# Patient Record
Sex: MALE | Race: WHITE | Employment: FULL TIME | URBAN - METROPOLITAN AREA
[De-identification: names, ages, dates, MRNs, and addresses within clinical notes are randomized per-mention and may not be internally consistent; named-entity substitution may affect disease eponyms.]

---

## 2017-01-05 ENCOUNTER — OFFICE VISIT (OUTPATIENT)
Dept: ORTHOPEDIC SURGERY | Age: 47
End: 2017-01-05

## 2017-01-05 VITALS
BODY MASS INDEX: 31.71 KG/M2 | HEIGHT: 69 IN | HEART RATE: 92 BPM | DIASTOLIC BLOOD PRESSURE: 86 MMHG | WEIGHT: 214.07 LBS | SYSTOLIC BLOOD PRESSURE: 133 MMHG

## 2017-01-05 DIAGNOSIS — M51.36 DDD (DEGENERATIVE DISC DISEASE), LUMBAR: Primary | ICD-10-CM

## 2017-01-05 PROCEDURE — 99243 OFF/OP CNSLTJ NEW/EST LOW 30: CPT | Performed by: PHYSICIAN ASSISTANT

## 2017-01-27 RX ORDER — CLONAZEPAM 0.5 MG/1
TABLET ORAL
Qty: 180 TABLET | Refills: 0 | Status: SHIPPED | OUTPATIENT
Start: 2017-01-27 | End: 2017-04-26 | Stop reason: SDUPTHER

## 2017-01-27 RX ORDER — PAROXETINE 10 MG/1
TABLET, FILM COATED ORAL
Qty: 30 TABLET | Refills: 0 | Status: SHIPPED | OUTPATIENT
Start: 2017-01-27 | End: 2017-02-10

## 2017-02-10 RX ORDER — VENLAFAXINE 37.5 MG/1
TABLET ORAL
Qty: 60 TABLET | Refills: 0 | Status: SHIPPED | OUTPATIENT
Start: 2017-02-10 | End: 2017-02-13 | Stop reason: SDUPTHER

## 2017-02-13 RX ORDER — VENLAFAXINE 75 MG/1
TABLET ORAL
Qty: 30 TABLET | Refills: 1 | Status: SHIPPED | OUTPATIENT
Start: 2017-02-13 | End: 2017-04-26

## 2017-02-27 RX ORDER — PAROXETINE 10 MG/1
TABLET, FILM COATED ORAL
Qty: 30 TABLET | Refills: 0 | Status: SHIPPED | OUTPATIENT
Start: 2017-02-27 | End: 2017-03-27 | Stop reason: SDUPTHER

## 2017-03-27 RX ORDER — PAROXETINE 10 MG/1
TABLET, FILM COATED ORAL
Qty: 30 TABLET | Refills: 0 | Status: SHIPPED | OUTPATIENT
Start: 2017-03-27 | End: 2017-04-05

## 2017-04-05 ENCOUNTER — OFFICE VISIT (OUTPATIENT)
Dept: FAMILY MEDICINE CLINIC | Age: 47
End: 2017-04-05

## 2017-04-05 VITALS
BODY MASS INDEX: 30.21 KG/M2 | SYSTOLIC BLOOD PRESSURE: 138 MMHG | OXYGEN SATURATION: 98 % | DIASTOLIC BLOOD PRESSURE: 80 MMHG | HEART RATE: 74 BPM | WEIGHT: 204 LBS | HEIGHT: 69 IN

## 2017-04-05 DIAGNOSIS — F41.0 PANIC ATTACKS: ICD-10-CM

## 2017-04-05 DIAGNOSIS — F41.9 ANXIETY: Primary | ICD-10-CM

## 2017-04-05 DIAGNOSIS — M51.36 DEGENERATIVE DISC DISEASE, LUMBAR: ICD-10-CM

## 2017-04-05 DIAGNOSIS — M54.40 CHRONIC LOW BACK PAIN WITH SCIATICA, SCIATICA LATERALITY UNSPECIFIED, UNSPECIFIED BACK PAIN LATERALITY: ICD-10-CM

## 2017-04-05 DIAGNOSIS — G89.29 CHRONIC LOW BACK PAIN WITH SCIATICA, SCIATICA LATERALITY UNSPECIFIED, UNSPECIFIED BACK PAIN LATERALITY: ICD-10-CM

## 2017-04-05 PROCEDURE — 99213 OFFICE O/P EST LOW 20 MIN: CPT | Performed by: FAMILY MEDICINE

## 2017-04-05 RX ORDER — PAROXETINE HYDROCHLORIDE 20 MG/1
20 TABLET, FILM COATED ORAL DAILY
Qty: 30 TABLET | Refills: 3 | Status: SHIPPED | OUTPATIENT
Start: 2017-04-05 | End: 2017-04-26 | Stop reason: SDUPTHER

## 2017-04-05 ASSESSMENT — ENCOUNTER SYMPTOMS
RESPIRATORY NEGATIVE: 1
GASTROINTESTINAL NEGATIVE: 1

## 2017-04-10 LAB
6-ACETYLMORPHINE: NOT DETECTED
7-AMINOCLONAZEPAM: NOT DETECTED
ALPHA-OH-ALPRAZOLAM: NOT DETECTED
ALPRAZOLAM: NOT DETECTED
AMPHETAMINE: NOT DETECTED
BARBITURATES: NOT DETECTED
BENZOYLECGONINE: NOT DETECTED
BUPRENORPHINE: NOT DETECTED
CARISOPRODOL: NOT DETECTED
CLONAZEPAM: NOT DETECTED
CODEINE: NOT DETECTED
CREATININE URINE: 176.4 MG/DL (ref 20–400)
DIAZEPAM: NOT DETECTED
DRUGS EXPECTED: NORMAL
EER PAIN MGT DRUG PANEL, HIGH RES/EMIT U: NORMAL
ETHYL GLUCURONIDE: NOT DETECTED
FENTANYL: NOT DETECTED
HYDROCODONE: NOT DETECTED
HYDROMORPHONE: NOT DETECTED
LORAZEPAM: NOT DETECTED
MARIJUANA METABOLITE: NOT DETECTED
MDA: NOT DETECTED
MDEA: NOT DETECTED
MDMA URINE: NOT DETECTED
MEPERIDINE: NOT DETECTED
METHADONE: NOT DETECTED
METHAMPHETAMINE: NOT DETECTED
METHYLPHENIDATE: NOT DETECTED
MIDAZOLAM: NOT DETECTED
MORPHINE: NOT DETECTED
NORBUPRENORPHINE, FREE: NOT DETECTED
NORDIAZEPAM: NOT DETECTED
NORFENTANYL: NOT DETECTED
NORHYDROCODONE, URINE: NOT DETECTED
NOROXYCODONE: NOT DETECTED
NOROXYMORPHONE, URINE: NOT DETECTED
OXAZEPAM: NOT DETECTED
OXYCODONE: NOT DETECTED
OXYMORPHONE: NOT DETECTED
PAIN MANAGEMENT DRUG PANEL: NORMAL
PAIN MANAGEMENT DRUG PANEL: NORMAL
PCP: NOT DETECTED
PHENTERMINE: NOT DETECTED
PROPOXYPHENE: NOT DETECTED
TAPENTADOL, URINE: NOT DETECTED
TAPENTADOL-O-SULFATE, URINE: NOT DETECTED
TEMAZEPAM: NOT DETECTED
TRAMADOL: NOT DETECTED
ZOLPIDEM: NOT DETECTED

## 2017-04-26 RX ORDER — CLONAZEPAM 0.5 MG/1
TABLET ORAL
Qty: 180 TABLET | Refills: 0 | Status: SHIPPED | OUTPATIENT
Start: 2017-04-26 | End: 2017-07-25 | Stop reason: SDUPTHER

## 2017-04-26 RX ORDER — PAROXETINE HYDROCHLORIDE 20 MG/1
20 TABLET, FILM COATED ORAL DAILY
Qty: 90 TABLET | Refills: 0 | Status: SHIPPED | OUTPATIENT
Start: 2017-04-26 | End: 2017-05-05

## 2017-07-13 ENCOUNTER — OFFICE VISIT (OUTPATIENT)
Dept: FAMILY MEDICINE CLINIC | Age: 47
End: 2017-07-13

## 2017-07-13 VITALS
DIASTOLIC BLOOD PRESSURE: 80 MMHG | BODY MASS INDEX: 31.1 KG/M2 | OXYGEN SATURATION: 99 % | WEIGHT: 210 LBS | SYSTOLIC BLOOD PRESSURE: 124 MMHG | HEART RATE: 80 BPM | HEIGHT: 69 IN

## 2017-07-13 DIAGNOSIS — F41.0 PANIC ATTACKS: ICD-10-CM

## 2017-07-13 DIAGNOSIS — F41.9 ANXIETY: Primary | ICD-10-CM

## 2017-07-13 PROCEDURE — 99213 OFFICE O/P EST LOW 20 MIN: CPT | Performed by: FAMILY MEDICINE

## 2017-07-13 ASSESSMENT — ENCOUNTER SYMPTOMS: RESPIRATORY NEGATIVE: 1

## 2017-07-27 RX ORDER — CLONAZEPAM 0.5 MG/1
TABLET ORAL
Qty: 180 TABLET | Refills: 0 | Status: SHIPPED | OUTPATIENT
Start: 2017-07-27 | End: 2017-11-07 | Stop reason: SDUPTHER

## 2017-08-02 RX ORDER — CLONAZEPAM 0.5 MG/1
TABLET ORAL
Qty: 180 TABLET | Refills: 0 | OUTPATIENT
Start: 2017-08-02

## 2017-08-30 RX ORDER — PAROXETINE HYDROCHLORIDE 20 MG/1
20 TABLET, FILM COATED ORAL DAILY
Qty: 90 TABLET | Refills: 0 | Status: SHIPPED | OUTPATIENT
Start: 2017-08-30 | End: 2018-01-02 | Stop reason: SDUPTHER

## 2017-09-11 RX ORDER — PAROXETINE HYDROCHLORIDE 20 MG/1
20 TABLET, FILM COATED ORAL DAILY
Qty: 90 TABLET | Refills: 0 | OUTPATIENT
Start: 2017-09-11

## 2017-10-13 ENCOUNTER — OFFICE VISIT (OUTPATIENT)
Dept: FAMILY MEDICINE CLINIC | Age: 47
End: 2017-10-13

## 2017-10-13 VITALS
HEART RATE: 71 BPM | BODY MASS INDEX: 29.98 KG/M2 | DIASTOLIC BLOOD PRESSURE: 80 MMHG | SYSTOLIC BLOOD PRESSURE: 120 MMHG | WEIGHT: 203 LBS | OXYGEN SATURATION: 96 %

## 2017-10-13 DIAGNOSIS — F41.9 ANXIETY: Primary | ICD-10-CM

## 2017-10-13 DIAGNOSIS — F41.0 PANIC ATTACKS: ICD-10-CM

## 2017-10-13 PROCEDURE — 99213 OFFICE O/P EST LOW 20 MIN: CPT | Performed by: FAMILY MEDICINE

## 2017-10-13 ASSESSMENT — ENCOUNTER SYMPTOMS
GASTROINTESTINAL NEGATIVE: 1
RESPIRATORY NEGATIVE: 1

## 2017-10-13 NOTE — PATIENT INSTRUCTIONS
Corey Knight was seen today for anxiety.     Diagnoses and all orders for this visit:    Anxiety  Meds as needed  Panic attacks  As above

## 2017-10-19 ENCOUNTER — OFFICE VISIT (OUTPATIENT)
Dept: URGENT CARE | Age: 47
End: 2017-10-19

## 2017-10-19 VITALS
DIASTOLIC BLOOD PRESSURE: 86 MMHG | HEART RATE: 66 BPM | BODY MASS INDEX: 30.07 KG/M2 | SYSTOLIC BLOOD PRESSURE: 114 MMHG | HEIGHT: 69 IN | TEMPERATURE: 98.6 F | WEIGHT: 203 LBS | RESPIRATION RATE: 12 BRPM

## 2017-10-19 DIAGNOSIS — S46.912A ELBOW STRAIN, LEFT, INITIAL ENCOUNTER: Primary | ICD-10-CM

## 2017-10-19 PROCEDURE — 99203 OFFICE O/P NEW LOW 30 MIN: CPT | Performed by: EMERGENCY MEDICINE

## 2017-10-19 RX ORDER — NAPROXEN 500 MG/1
500 TABLET ORAL 2 TIMES DAILY PRN
Qty: 30 TABLET | Refills: 0 | Status: SHIPPED | OUTPATIENT
Start: 2017-10-19 | End: 2020-01-22

## 2017-10-19 NOTE — PROGRESS NOTES
Subjective:      Patient ID: Roberto Juarez is a 51 y.o. male. Left elbow pain after left forearm was \"jerked\" while lifting a log. Arm Injury   This is a new problem. Episode onset: 4 days ago. The problem occurs constantly. The problem has been gradually improving. Pertinent negatives include no numbness or weakness. Associated symptoms comments: None. The symptoms are aggravated by bending. He has tried NSAIDs for the symptoms. The treatment provided mild relief. Review of Systems   Musculoskeletal:        Left elbow pain   Neurological: Negative for weakness and numbness. All other systems reviewed and are negative. Objective:   Physical Exam   Constitutional: He is oriented to person, place, and time. He appears well-developed and well-nourished. No distress. Musculoskeletal: Normal range of motion. He exhibits tenderness. He exhibits no edema or deformity. Normal exam of the left upper extremity, no swelling or deformity noted. Pain on palpation over the medial epicondyle. FROM, no neurovascular deficits. Neurological: He is alert and oriented to person, place, and time. He has normal reflexes. No cranial nerve deficit. He exhibits normal muscle tone. Coordination normal.   Skin: Skin is warm and dry. No rash noted. He is not diaphoretic. No erythema. No pallor. Psychiatric: He has a normal mood and affect. His behavior is normal. Judgment and thought content normal.   Nursing note and vitals reviewed. Assessment:      1. Elbow strain, left, initial encounter            Plan:      Prasad Lopez was seen today for elbow injury. Diagnoses and all orders for this visit:    Elbow strain, left, initial encounter  -     XR ELBOW LEFT (2 VIEWS); Future  -     Slings  -     naproxen (NAPROSYN) 500 MG tablet; Take 1 tablet by mouth 2 times daily as needed for Pain      X-rays on my review were negative for acute findings.

## 2017-10-19 NOTE — PATIENT INSTRUCTIONS
Follow-up with your primary care physician in 1 week if not improving. If you do not have a primary care physician, call [phone] for a referral or visit [email or web address]    Patient Education        Elbow Sprain: Care Instructions  Your Care Instructions    An elbow sprain occurs when you overstretch or tear the ligaments around your elbow. Ligaments are the tough tissues that connect one bone to another. A sprain can happen when you fall or when you play sports or do chores around the house. Most sprains will heal with some treatment at home. Follow-up care is a key part of your treatment and safety. Be sure to make and go to all appointments, and call your doctor if you are having problems. It's also a good idea to know your test results and keep a list of the medicines you take. How can you care for yourself at home? · Follow your doctor's directions for wearing a splint, elbow pad, sling, or elastic bandage. Wrapping the elbow may help reduce or prevent swelling. · Rest and protect your elbow. Do not do any activity that hurts your elbow. · Apply ice or a cold pack to your elbow for 10 to 20 minutes at a time to reduce swelling. Try this every 1 to 2 hours for 3 days (when you are awake) or until the swelling goes down. Put a thin cloth between the ice and your skin. · After 2 or 3 days, if your swelling is gone, apply a heating pad on low or a warm cloth to your elbow. This helps keep your arm flexible. Some doctors suggest that you go back and forth between hot and cold. Keep the splint dry. · Prop up your elbow on pillows while you apply ice or anytime you sit or lie down. Try to keep the elbow at or above the level of your heart to help reduce swelling. · Take pain medicines exactly as directed. ¨ If the doctor gave you a prescription medicine for pain, take it as prescribed.   ¨ If you are not taking a prescription pain medicine, ask your doctor if you can take an over-the-counter

## 2017-11-09 RX ORDER — CLONAZEPAM 0.5 MG/1
TABLET ORAL
Qty: 180 TABLET | Refills: 0 | Status: SHIPPED | OUTPATIENT
Start: 2017-11-09 | End: 2018-02-10 | Stop reason: SDUPTHER

## 2018-01-17 ENCOUNTER — OFFICE VISIT (OUTPATIENT)
Dept: FAMILY MEDICINE CLINIC | Age: 48
End: 2018-01-17

## 2018-01-17 VITALS
OXYGEN SATURATION: 98 % | HEIGHT: 69 IN | WEIGHT: 214 LBS | SYSTOLIC BLOOD PRESSURE: 120 MMHG | HEART RATE: 81 BPM | DIASTOLIC BLOOD PRESSURE: 70 MMHG | BODY MASS INDEX: 31.7 KG/M2

## 2018-01-17 DIAGNOSIS — Z23 FLU VACCINE NEED: ICD-10-CM

## 2018-01-17 DIAGNOSIS — F41.9 ANXIETY: Primary | ICD-10-CM

## 2018-01-17 DIAGNOSIS — F41.0 PANIC ATTACKS: ICD-10-CM

## 2018-01-17 PROCEDURE — 90686 IIV4 VACC NO PRSV 0.5 ML IM: CPT | Performed by: FAMILY MEDICINE

## 2018-01-17 PROCEDURE — 99213 OFFICE O/P EST LOW 20 MIN: CPT | Performed by: FAMILY MEDICINE

## 2018-01-17 PROCEDURE — 90471 IMMUNIZATION ADMIN: CPT | Performed by: FAMILY MEDICINE

## 2018-01-17 ASSESSMENT — ENCOUNTER SYMPTOMS
RESPIRATORY NEGATIVE: 1
GASTROINTESTINAL NEGATIVE: 1

## 2018-01-17 NOTE — PROGRESS NOTES
Subjective:      Patient ID: Mick Moreno is a 49 y.o. male. In for check on Anxiety & Panic--doig OK w meds    Prior to Visit Medications :  Medication PARoxetine (PAXIL) 20 MG tablet, Sig TAKE 1 TABLET BY MOUTH ONE TIME A DAY , Taking? Yes, Authorizing Provider David Stevens, DO    Medication clonazePAM (KLONOPIN) 0.5 MG tablet, Sig TAKE 1 TABLET BY MOUTH TWO TIMES A DAY AS NEEDED FOR ANXIETY, Taking? Yes, Authorizing Provider David Stevens, DO    Medication naproxen (NAPROSYN) 500 MG tablet, Sig Take 1 tablet by mouth 2 times daily as needed for Pain, Taking? Yes, Authorizing Provider Pedro Godwin MD    Medication meloxicam (MOBIC) 15 MG tablet, Sig i po qd PRN, Taking? Yes, Authorizing Provider Albert Dasilva PA-C    Medication therapeutic multivitamin-minerals [facility]) tablet, Sig Take 1 tablet by mouth daily. Indications: OTC, Taking? Yes, Authorizing Provider Historical MD Patito      Past Medical History:  No date: Anxiety  No date: Chronic low back pain      Comment: level 5  4/9/2015: Degenerative disc disease, lumbar  No date: Hyperlipidemia  No date: Hypertension  No date: Sleep apnea      Comment: uses c-pap          Review of Systems   Respiratory: Negative. Cardiovascular: Negative. Gastrointestinal: Negative. Psychiatric/Behavioral: Positive for sleep disturbance. The patient is nervous/anxious. Objective:   Physical Exam   Constitutional: He is oriented to person, place, and time. Cardiovascular: Normal rate, regular rhythm and normal heart sounds. Pulmonary/Chest: Effort normal and breath sounds normal.   Abdominal: There is no tenderness. Neurological: He is alert and oriented to person, place, and time. Psychiatric: He has a normal mood and affect. His behavior is normal. Judgment and thought content normal.       Assessment:      1. Anxiety    2. Flu vaccine need    3.  Panic attacks      '      Plan:      Sergey Rome was seen today for

## 2018-02-12 RX ORDER — CLONAZEPAM 0.5 MG/1
TABLET ORAL
Qty: 180 TABLET | Refills: 0 | Status: SHIPPED | OUTPATIENT
Start: 2018-02-12 | End: 2018-05-21 | Stop reason: SDUPTHER

## 2018-02-12 NOTE — TELEPHONE ENCOUNTER
Last Seen: 1/17/2018    Last Writen: 11/9/17    Last UDS: 4/5/17    OARRS Run On: 2/12/18    Med Agreement Signed On: n/a    Next Appointment: 4/18/2018    Requested Prescriptions     Pending Prescriptions Disp Refills    clonazePAM (KLONOPIN) 0.5 MG tablet [Pharmacy Med Name: ClonazePAM Oral Tablet 0.5 MG] 180 tablet 0     Sig: TAKE 1 TABLET BY MOUTH TWO TIMES A DAY AS NEEDED FOR ANXIETY

## 2018-02-19 ENCOUNTER — OFFICE VISIT (OUTPATIENT)
Dept: FAMILY MEDICINE CLINIC | Age: 48
End: 2018-02-19

## 2018-02-19 VITALS
BODY MASS INDEX: 31.55 KG/M2 | SYSTOLIC BLOOD PRESSURE: 126 MMHG | WEIGHT: 213 LBS | HEIGHT: 69 IN | OXYGEN SATURATION: 97 % | HEART RATE: 73 BPM | DIASTOLIC BLOOD PRESSURE: 88 MMHG

## 2018-02-19 DIAGNOSIS — M75.82 ROTATOR CUFF TENDINITIS, LEFT: ICD-10-CM

## 2018-02-19 DIAGNOSIS — G47.9 SLEEP DISTURBANCE: Primary | ICD-10-CM

## 2018-02-19 PROCEDURE — 99213 OFFICE O/P EST LOW 20 MIN: CPT | Performed by: FAMILY MEDICINE

## 2018-02-19 ASSESSMENT — PATIENT HEALTH QUESTIONNAIRE - PHQ9
SUM OF ALL RESPONSES TO PHQ9 QUESTIONS 1 & 2: 0
1. LITTLE INTEREST OR PLEASURE IN DOING THINGS: 0
SUM OF ALL RESPONSES TO PHQ QUESTIONS 1-9: 0
2. FEELING DOWN, DEPRESSED OR HOPELESS: 0

## 2018-02-19 ASSESSMENT — ENCOUNTER SYMPTOMS: RESPIRATORY NEGATIVE: 1

## 2018-02-22 ENCOUNTER — TELEPHONE (OUTPATIENT)
Dept: FAMILY MEDICINE CLINIC | Age: 48
End: 2018-02-22

## 2018-02-22 RX ORDER — AZITHROMYCIN 250 MG/1
TABLET, FILM COATED ORAL
Qty: 6 TABLET | Refills: 0 | Status: SHIPPED | OUTPATIENT
Start: 2018-02-22 | End: 2018-03-04

## 2018-02-22 RX ORDER — AZITHROMYCIN 250 MG/1
TABLET, FILM COATED ORAL
Qty: 6 TABLET | Refills: 0 | Status: SHIPPED | OUTPATIENT
Start: 2018-02-22 | End: 2018-02-22 | Stop reason: SDUPTHER

## 2018-03-16 ENCOUNTER — OFFICE VISIT (OUTPATIENT)
Dept: FAMILY MEDICINE CLINIC | Age: 48
End: 2018-03-16

## 2018-03-16 VITALS
WEIGHT: 221 LBS | OXYGEN SATURATION: 98 % | SYSTOLIC BLOOD PRESSURE: 126 MMHG | HEART RATE: 68 BPM | HEIGHT: 69 IN | DIASTOLIC BLOOD PRESSURE: 88 MMHG | BODY MASS INDEX: 32.73 KG/M2

## 2018-03-16 DIAGNOSIS — M99.08 RIB CAGE DYSFUNCTION: ICD-10-CM

## 2018-03-16 DIAGNOSIS — G47.33 OSA (OBSTRUCTIVE SLEEP APNEA): Primary | ICD-10-CM

## 2018-03-16 PROCEDURE — 99213 OFFICE O/P EST LOW 20 MIN: CPT | Performed by: FAMILY MEDICINE

## 2018-03-16 ASSESSMENT — ENCOUNTER SYMPTOMS
RESPIRATORY NEGATIVE: 1
GASTROINTESTINAL NEGATIVE: 1

## 2018-03-16 NOTE — PATIENT INSTRUCTIONS
Perla Johnson was seen today for results and flank pain.     Diagnoses and all orders for this visit:    HAYDEN (obstructive sleep apnea)  Refer to Pulm  Rib cage dysfunction  2 Aleve twice a day x 5 day

## 2018-03-16 NOTE — PROGRESS NOTES
Subjective:      Patient ID: Ping Miller is a 49 y.o. male. In for check on results of sleep eval--positive for mod hayden. Pain lower lat Lt ribs-onset2-3 weeks-no trauma except first noticed when bent over & coughged-OTC no help    Prior to Visit Medications :  Medication PARoxetine (PAXIL) 20 MG tablet, Sig TAKE 1 TABLET BY MOUTH ONE TIME A DAY , Taking? Yes, Authorizing Provider David Stevens DO    Medication naproxen (NAPROSYN) 500 MG tablet, Sig Take 1 tablet by mouth 2 times daily as needed for Pain, Taking? Yes, Authorizing Provider Lilian Acevedo MD    Medication meloxicam (MOBIC) 15 MG tablet, Sig i po qd PRN, Taking? Yes, Authorizing Provider Eloy Maldonado PA-C    Medication therapeutic multivitamin-minerals [facility]) tablet, Sig Take 1 tablet by mouth daily. Indications: OTC, Taking? Yes, Authorizing Provider Rodrigo Caputo MD    Medication clonazePAM (KLONOPIN) 0.5 MG tablet, Sig TAKE 1 TABLET BY MOUTH TWO TIMES A DAY AS NEEDED FOR ANXIETY , Taking? , Authorizing Provider Latoya Bundy DO      Past Medical History:  No date: Anxiety  No date: Chronic low back pain      Comment: level 5  4/9/2015: Degenerative disc disease, lumbar  No date: Hyperlipidemia  No date: Hypertension  No date: Sleep apnea      Comment: uses c-pap          Review of Systems   Respiratory: Negative. Cardiovascular: Negative. Gastrointestinal: Negative. Objective:   Physical Exam   Constitutional: He is oriented to person, place, and time. Cardiovascular: Normal rate, regular rhythm and normal heart sounds. Pulmonary/Chest: Effort normal and breath sounds normal.   Tender inf Lt lat rib cage   Abdominal: Soft. Neurological: He is alert and oriented to person, place, and time. Assessment:      1. HAYDEN (obstructive sleep apnea)    2. Rib cage dysfunction            Plan:      Fatemeh Barrett was seen today for results and flank pain.     Diagnoses and all orders for this visit:    HAYDEN

## 2018-05-21 RX ORDER — CLONAZEPAM 0.5 MG/1
TABLET ORAL
Qty: 180 TABLET | Refills: 0 | Status: SHIPPED | OUTPATIENT
Start: 2018-05-21 | End: 2018-08-22 | Stop reason: SDUPTHER

## 2018-05-23 ENCOUNTER — OFFICE VISIT (OUTPATIENT)
Dept: FAMILY MEDICINE CLINIC | Age: 48
End: 2018-05-23

## 2018-05-23 VITALS
SYSTOLIC BLOOD PRESSURE: 115 MMHG | WEIGHT: 212 LBS | DIASTOLIC BLOOD PRESSURE: 86 MMHG | BODY MASS INDEX: 31.4 KG/M2 | OXYGEN SATURATION: 98 % | HEART RATE: 70 BPM | HEIGHT: 69 IN

## 2018-05-23 DIAGNOSIS — F41.9 ANXIETY: Primary | ICD-10-CM

## 2018-05-23 DIAGNOSIS — Z01.89 ENCOUNTER FOR URINE TEST: ICD-10-CM

## 2018-05-23 LAB
BILIRUBIN, POC: NORMAL
BLOOD URINE, POC: NORMAL
CLARITY, POC: CLEAR
COLOR, POC: CLEAR
GLUCOSE URINE, POC: NORMAL
KETONES, POC: NORMAL
LEUKOCYTE EST, POC: NORMAL
NITRITE, POC: NORMAL
PH, POC: 5.5
PROTEIN, POC: NORMAL
SPECIFIC GRAVITY, POC: <=1.005
UROBILINOGEN, POC: 0.2

## 2018-05-23 PROCEDURE — 81002 URINALYSIS NONAUTO W/O SCOPE: CPT | Performed by: FAMILY MEDICINE

## 2018-05-23 PROCEDURE — 99213 OFFICE O/P EST LOW 20 MIN: CPT | Performed by: FAMILY MEDICINE

## 2018-05-23 ASSESSMENT — ENCOUNTER SYMPTOMS: RESPIRATORY NEGATIVE: 1

## 2018-05-28 LAB
6-ACETYLMORPHINE: NOT DETECTED
7-AMINOCLONAZEPAM: NOT DETECTED
ALPHA-OH-ALPRAZOLAM: NOT DETECTED
ALPRAZOLAM: NOT DETECTED
AMPHETAMINE: NOT DETECTED
BARBITURATES: NOT DETECTED
BENZOYLECGONINE: NOT DETECTED
BUPRENORPHINE: NOT DETECTED
CARISOPRODOL: NOT DETECTED
CLONAZEPAM: NOT DETECTED
CODEINE: NOT DETECTED
CREATININE URINE: 22.1 MG/DL (ref 20–400)
DIAZEPAM: NOT DETECTED
DRUGS EXPECTED: NORMAL
EER PAIN MGT DRUG PANEL, HIGH RES/EMIT U: NORMAL
ETHYL GLUCURONIDE: NOT DETECTED
FENTANYL: NOT DETECTED
HYDROCODONE: NOT DETECTED
HYDROMORPHONE: NOT DETECTED
LORAZEPAM: NOT DETECTED
MARIJUANA METABOLITE: NOT DETECTED
MDA: NOT DETECTED
MDEA: NOT DETECTED
MDMA URINE: NOT DETECTED
MEPERIDINE: NOT DETECTED
METHADONE: NOT DETECTED
METHAMPHETAMINE: NOT DETECTED
METHYLPHENIDATE: NOT DETECTED
MIDAZOLAM: NOT DETECTED
MORPHINE: NOT DETECTED
NORBUPRENORPHINE, FREE: NOT DETECTED
NORDIAZEPAM: NOT DETECTED
NORFENTANYL: NOT DETECTED
NORHYDROCODONE, URINE: NOT DETECTED
NOROXYCODONE: NOT DETECTED
NOROXYMORPHONE, URINE: NOT DETECTED
OXAZEPAM: NOT DETECTED
OXYCODONE: NOT DETECTED
OXYMORPHONE: NOT DETECTED
PAIN MANAGEMENT DRUG PANEL: NORMAL
PAIN MANAGEMENT DRUG PANEL: NORMAL
PCP: NOT DETECTED
PHENTERMINE: NOT DETECTED
PROPOXYPHENE: NOT DETECTED
TAPENTADOL, URINE: NOT DETECTED
TAPENTADOL-O-SULFATE, URINE: NOT DETECTED
TEMAZEPAM: NOT DETECTED
TRAMADOL: NOT DETECTED
ZOLPIDEM: NOT DETECTED

## 2018-06-29 RX ORDER — PAROXETINE HYDROCHLORIDE 20 MG/1
TABLET, FILM COATED ORAL
Qty: 90 TABLET | Refills: 0 | Status: SHIPPED | OUTPATIENT
Start: 2018-06-29 | End: 2018-09-29 | Stop reason: SDUPTHER

## 2018-10-01 RX ORDER — PAROXETINE HYDROCHLORIDE 20 MG/1
TABLET, FILM COATED ORAL
Qty: 90 TABLET | Refills: 0 | Status: SHIPPED | OUTPATIENT
Start: 2018-10-01 | End: 2018-12-26 | Stop reason: SDUPTHER

## 2018-11-26 ENCOUNTER — OFFICE VISIT (OUTPATIENT)
Dept: FAMILY MEDICINE CLINIC | Age: 48
End: 2018-11-26
Payer: COMMERCIAL

## 2018-11-26 VITALS
WEIGHT: 220 LBS | BODY MASS INDEX: 32.58 KG/M2 | HEART RATE: 108 BPM | HEIGHT: 69 IN | SYSTOLIC BLOOD PRESSURE: 120 MMHG | DIASTOLIC BLOOD PRESSURE: 78 MMHG | OXYGEN SATURATION: 97 %

## 2018-11-26 DIAGNOSIS — F41.9 ANXIETY: Primary | ICD-10-CM

## 2018-11-26 DIAGNOSIS — Z23 FLU VACCINE NEED: ICD-10-CM

## 2018-11-26 PROCEDURE — 90471 IMMUNIZATION ADMIN: CPT | Performed by: FAMILY MEDICINE

## 2018-11-26 PROCEDURE — 90715 TDAP VACCINE 7 YRS/> IM: CPT | Performed by: FAMILY MEDICINE

## 2018-11-26 PROCEDURE — 90686 IIV4 VACC NO PRSV 0.5 ML IM: CPT | Performed by: FAMILY MEDICINE

## 2018-11-26 PROCEDURE — 90472 IMMUNIZATION ADMIN EACH ADD: CPT | Performed by: FAMILY MEDICINE

## 2018-11-26 PROCEDURE — 99213 OFFICE O/P EST LOW 20 MIN: CPT | Performed by: FAMILY MEDICINE

## 2018-11-26 ASSESSMENT — ENCOUNTER SYMPTOMS
COUGH: 0
SHORTNESS OF BREATH: 0

## 2018-11-26 NOTE — PATIENT INSTRUCTIONS
Anxiety  Meds as needed-keep active  Flu vaccine need  -     INFLUENZA, QUADV, 3 YRS AND OLDER, IM, PF, PREFILL SYR OR SDV, 0.5ML (FLUZONE QUADV, PF)    See me 3 mos

## 2018-12-04 DIAGNOSIS — F41.9 ANXIETY: ICD-10-CM

## 2018-12-05 RX ORDER — CLONAZEPAM 0.5 MG/1
TABLET ORAL
Qty: 180 TABLET | Refills: 0 | Status: SHIPPED | OUTPATIENT
Start: 2018-12-05 | End: 2019-03-04 | Stop reason: SDUPTHER

## 2018-12-27 RX ORDER — PAROXETINE HYDROCHLORIDE 20 MG/1
TABLET, FILM COATED ORAL
Qty: 90 TABLET | Refills: 0 | Status: SHIPPED | OUTPATIENT
Start: 2018-12-27 | End: 2018-12-31

## 2018-12-27 NOTE — TELEPHONE ENCOUNTER
.  Last office visit 11-26-18    Last written 10-1-18 #90 with 0      Next office visit scheduled 2-28-19    Requested Prescriptions     Pending Prescriptions Disp Refills    PARoxetine (PAXIL) 20 MG tablet [Pharmacy Med Name: PARoxetine HCl Oral Tablet 20 MG] 90 tablet 0     Sig: TAKE 1 TABLET BY MOUTH ONE TIME A DAY

## 2018-12-31 RX ORDER — PAROXETINE HYDROCHLORIDE 20 MG/1
TABLET, FILM COATED ORAL
Qty: 90 TABLET | Refills: 0 | Status: SHIPPED | OUTPATIENT
Start: 2018-12-31 | End: 2019-03-04 | Stop reason: SDUPTHER

## 2019-02-28 ENCOUNTER — OFFICE VISIT (OUTPATIENT)
Dept: FAMILY MEDICINE CLINIC | Age: 49
End: 2019-02-28
Payer: COMMERCIAL

## 2019-02-28 VITALS
SYSTOLIC BLOOD PRESSURE: 130 MMHG | BODY MASS INDEX: 32.56 KG/M2 | HEART RATE: 81 BPM | HEIGHT: 69 IN | WEIGHT: 219.8 LBS | DIASTOLIC BLOOD PRESSURE: 80 MMHG | OXYGEN SATURATION: 95 %

## 2019-02-28 DIAGNOSIS — F41.9 ANXIETY: ICD-10-CM

## 2019-02-28 DIAGNOSIS — R07.89 CHEST TIGHTNESS: ICD-10-CM

## 2019-02-28 DIAGNOSIS — R53.82 CHRONIC FATIGUE: Primary | ICD-10-CM

## 2019-02-28 DIAGNOSIS — Z12.5 SPECIAL SCREENING FOR MALIGNANT NEOPLASM OF PROSTATE: ICD-10-CM

## 2019-02-28 DIAGNOSIS — I10 DIASTOLIC HYPERTENSION: ICD-10-CM

## 2019-02-28 PROBLEM — G47.33 OSA (OBSTRUCTIVE SLEEP APNEA): Status: RESOLVED | Noted: 2018-03-16 | Resolved: 2019-02-28

## 2019-02-28 PROCEDURE — 99214 OFFICE O/P EST MOD 30 MIN: CPT | Performed by: FAMILY MEDICINE

## 2019-02-28 ASSESSMENT — ENCOUNTER SYMPTOMS
BLOOD IN STOOL: 0
SHORTNESS OF BREATH: 1
ABDOMINAL PAIN: 0

## 2019-03-02 ENCOUNTER — HOSPITAL ENCOUNTER (OUTPATIENT)
Age: 49
Discharge: HOME | End: 2019-03-02
Payer: COMMERCIAL

## 2019-03-02 DIAGNOSIS — R53.82 CHRONIC FATIGUE: ICD-10-CM

## 2019-03-02 LAB
A/G RATIO: 1.6 (ref 1.1–2.2)
ALBUMIN SERPL-MCNC: 4.4 G/DL (ref 3.4–5)
ALP BLD-CCNC: 40 U/L (ref 40–129)
ALT SERPL-CCNC: 29 U/L (ref 10–40)
ANION GAP SERPL CALCULATED.3IONS-SCNC: 12 MMOL/L (ref 3–16)
AST SERPL-CCNC: 21 U/L (ref 15–37)
BASOPHILS ABSOLUTE: 0 K/UL (ref 0–0.2)
BASOPHILS RELATIVE PERCENT: 0.6 %
BILIRUB SERPL-MCNC: 0.5 MG/DL (ref 0–1)
BUN BLDV-MCNC: 15 MG/DL (ref 7–20)
C-REACTIVE PROTEIN: 0.9 MG/L (ref 0–5.1)
CALCIUM SERPL-MCNC: 9.4 MG/DL (ref 8.3–10.6)
CHLORIDE BLD-SCNC: 102 MMOL/L (ref 99–110)
CHOLESTEROL, TOTAL: 254 MG/DL (ref 0–199)
CO2: 24 MMOL/L (ref 21–32)
CREAT SERPL-MCNC: 0.9 MG/DL (ref 0.9–1.3)
EOSINOPHILS ABSOLUTE: 0.1 K/UL (ref 0–0.6)
EOSINOPHILS RELATIVE PERCENT: 2.8 %
GFR AFRICAN AMERICAN: >60
GFR NON-AFRICAN AMERICAN: >60
GLOBULIN: 2.8 G/DL
GLUCOSE BLD-MCNC: 89 MG/DL (ref 70–99)
HCT VFR BLD CALC: 42.8 % (ref 40.5–52.5)
HDLC SERPL-MCNC: 55 MG/DL (ref 40–60)
HEMOGLOBIN: 14.2 G/DL (ref 13.5–17.5)
LDL CHOLESTEROL CALCULATED: 176 MG/DL
LYMPHOCYTES ABSOLUTE: 1.7 K/UL (ref 1–5.1)
LYMPHOCYTES RELATIVE PERCENT: 37.5 %
MCH RBC QN AUTO: 28.7 PG (ref 26–34)
MCHC RBC AUTO-ENTMCNC: 33.1 G/DL (ref 31–36)
MCV RBC AUTO: 86.7 FL (ref 80–100)
MONOCYTES ABSOLUTE: 0.4 K/UL (ref 0–1.3)
MONOCYTES RELATIVE PERCENT: 9.4 %
NEUTROPHILS ABSOLUTE: 2.2 K/UL (ref 1.7–7.7)
NEUTROPHILS RELATIVE PERCENT: 49.7 %
PDW BLD-RTO: 14.2 % (ref 12.4–15.4)
PLATELET # BLD: 214 K/UL (ref 135–450)
PMV BLD AUTO: 9.9 FL (ref 5–10.5)
POTASSIUM SERPL-SCNC: 4.1 MMOL/L (ref 3.5–5.1)
PROSTATE SPECIFIC ANTIGEN: 1.49 NG/ML (ref 0–4)
RBC # BLD: 4.94 M/UL (ref 4.2–5.9)
SEDIMENTATION RATE, ERYTHROCYTE: 7 MM/HR (ref 0–15)
SODIUM BLD-SCNC: 138 MMOL/L (ref 136–145)
TOTAL PROTEIN: 7.2 G/DL (ref 6.4–8.2)
TRIGL SERPL-MCNC: 113 MG/DL (ref 0–150)
TSH SERPL DL<=0.05 MIU/L-ACNC: 1.83 UIU/ML (ref 0.27–4.2)
VLDLC SERPL CALC-MCNC: 23 MG/DL
WBC # BLD: 4.5 K/UL (ref 4–11)

## 2019-03-02 PROCEDURE — 85025 COMPLETE CBC W/AUTO DIFF WBC: CPT

## 2019-03-02 PROCEDURE — 85652 RBC SED RATE AUTOMATED: CPT

## 2019-03-02 PROCEDURE — 84443 ASSAY THYROID STIM HORMONE: CPT

## 2019-03-02 PROCEDURE — 80061 LIPID PANEL: CPT

## 2019-03-02 PROCEDURE — 84153 ASSAY OF PSA TOTAL: CPT

## 2019-03-02 PROCEDURE — 80053 COMPREHEN METABOLIC PANEL: CPT

## 2019-03-02 PROCEDURE — 36415 COLL VENOUS BLD VENIPUNCTURE: CPT

## 2019-03-02 PROCEDURE — 86140 C-REACTIVE PROTEIN: CPT

## 2019-03-04 DIAGNOSIS — F41.9 ANXIETY: ICD-10-CM

## 2019-03-04 RX ORDER — PAROXETINE HYDROCHLORIDE 20 MG/1
TABLET, FILM COATED ORAL
Qty: 90 TABLET | Refills: 1 | Status: SHIPPED | OUTPATIENT
Start: 2019-03-04 | End: 2019-04-03

## 2019-03-04 RX ORDER — CLONAZEPAM 0.5 MG/1
TABLET ORAL
Qty: 180 TABLET | Refills: 0 | Status: SHIPPED | OUTPATIENT
Start: 2019-03-04 | End: 2019-06-03 | Stop reason: SDUPTHER

## 2019-03-04 RX ORDER — ATORVASTATIN CALCIUM 10 MG/1
10 TABLET, FILM COATED ORAL DAILY
Qty: 30 TABLET | Refills: 2 | Status: SHIPPED | OUTPATIENT
Start: 2019-03-04 | End: 2019-05-29 | Stop reason: SDUPTHER

## 2019-04-03 ENCOUNTER — OFFICE VISIT (OUTPATIENT)
Dept: PSYCHOLOGY | Age: 49
End: 2019-04-03
Payer: COMMERCIAL

## 2019-04-03 DIAGNOSIS — F33.1 MODERATE EPISODE OF RECURRENT MAJOR DEPRESSIVE DISORDER (HCC): ICD-10-CM

## 2019-04-03 DIAGNOSIS — F41.1 GAD (GENERALIZED ANXIETY DISORDER): ICD-10-CM

## 2019-04-03 PROCEDURE — 90791 PSYCH DIAGNOSTIC EVALUATION: CPT | Performed by: PSYCHOLOGIST

## 2019-04-03 ASSESSMENT — ANXIETY QUESTIONNAIRES
1. FEELING NERVOUS, ANXIOUS, OR ON EDGE: 2-OVER HALF THE DAYS
6. BECOMING EASILY ANNOYED OR IRRITABLE: 2-OVER HALF THE DAYS
3. WORRYING TOO MUCH ABOUT DIFFERENT THINGS: 2-OVER HALF THE DAYS
2. NOT BEING ABLE TO STOP OR CONTROL WORRYING: 2-OVER HALF THE DAYS
GAD7 TOTAL SCORE: 11
7. FEELING AFRAID AS IF SOMETHING AWFUL MIGHT HAPPEN: 0-NOT AT ALL SURE
4. TROUBLE RELAXING: 2-OVER HALF THE DAYS
5. BEING SO RESTLESS THAT IT IS HARD TO SIT STILL: 1-SEVERAL DAYS

## 2019-04-03 ASSESSMENT — PATIENT HEALTH QUESTIONNAIRE - PHQ9
1. LITTLE INTEREST OR PLEASURE IN DOING THINGS: 3
9. THOUGHTS THAT YOU WOULD BE BETTER OFF DEAD, OR OF HURTING YOURSELF: 0
SUM OF ALL RESPONSES TO PHQ QUESTIONS 1-9: 14
2. FEELING DOWN, DEPRESSED OR HOPELESS: 2
3. TROUBLE FALLING OR STAYING ASLEEP: 3
SUM OF ALL RESPONSES TO PHQ QUESTIONS 1-9: 14
4. FEELING TIRED OR HAVING LITTLE ENERGY: 3
6. FEELING BAD ABOUT YOURSELF - OR THAT YOU ARE A FAILURE OR HAVE LET YOURSELF OR YOUR FAMILY DOWN: 1
5. POOR APPETITE OR OVEREATING: 0
8. MOVING OR SPEAKING SO SLOWLY THAT OTHER PEOPLE COULD HAVE NOTICED. OR THE OPPOSITE, BEING SO FIGETY OR RESTLESS THAT YOU HAVE BEEN MOVING AROUND A LOT MORE THAN USUAL: 2
10. IF YOU CHECKED OFF ANY PROBLEMS, HOW DIFFICULT HAVE THESE PROBLEMS MADE IT FOR YOU TO DO YOUR WORK, TAKE CARE OF THINGS AT HOME, OR GET ALONG WITH OTHER PEOPLE: 3
SUM OF ALL RESPONSES TO PHQ9 QUESTIONS 1 & 2: 5
7. TROUBLE CONCENTRATING ON THINGS, SUCH AS READING THE NEWSPAPER OR WATCHING TELEVISION: 0

## 2019-04-03 NOTE — PROGRESS NOTES
Behavioral Health Consultation  [address] PsyD  Psychologist  4/3/2019  11:10 AM      Time spent with Patient:30 minutes  This is patient's first [facility] appointment. Reason for Consult:  Depression, anxiety  Referring Provider: Dom Tello, DO  [address] [address] [place], [address] [facility] [address]    Pt provided informed consent for the behavioral health program. Discussed with patient model of service to include the limits of confidentiality (i.e. abuse reporting, suicide intervention, etc.) and short-term intervention focused approach. Pt indicated understanding. Feedback given to PCP. S:  Pt reports he has always been on psych meds since 1996. Seemed like medication wasn't working. Has had periods of mood being worse. Recently it's worse. Sleeps all the time. Previous episodes were a couple weeks. Has been dealing with this depression for 20 years. Last couple of years has lost interest in most anything he does. Works for "[employer] Has been treated primarily for anxiety and panic attacks. Used to have panic attacks all the time. Now anxiety is ok and \"pretty good. \" Not sure if that's due to avoiding things that set him off. Has been in therapy in the past. Been to a couple psychologists off and on. Didn't find it helpful. Was talk therapy. If feels ok, wakes for work and goes. Comes home and relaxes. If it's a bad day, sleeps all day or watches TV on the couch. Used to enjoy doing projects and working on stuff but now doesn't care. Bad day is if waking an feeling so fatigued. Has had sleep studies. Got last sleep study last year and now uses dental implant. Actively using this now so sleep apnea well managed, per sleep study using the dental. Sleep schedule varies. Sometimes comes home and sleeps 2-3 hours then is up. Goes back to bed around 11pm if takes night medications.       O:  MSE:    Appearance    alert, cooperative  Sleep disturbance Yes  Fatigue Yes  Loss of pleasure Yes  Impulsive behavior Yes  Speech    normal rate and normal volume  Mood    \"depressed\"  Affect   Congruent to thought content and mood  Thought Content    intact  Thought Process    linear and goal directed  Associations    logical connections  Insight    Good  Judgment    Intact  Orientation    oriented to person, place, time, and general circumstances  Memory    recent and remote memory intact  Attention/Concentration    intact  Ability to understand instructions Yes  Ability to respond meaningfully Yes  Suicide Assessment    Denies SI      History:    Medications:   Current Outpatient Medications   Medication Sig Dispense Refill    PARoxetine (PAXIL) 20 MG tablet TAKE 1 TABLET BY MOUTH ONE TIME A DAY 90 tablet 1    clonazePAM (KLONOPIN) 0.5 MG tablet TAKE 1 TABLET BY MOUTH TWO TIMES A DAY AS NEEDED FOR ANXIETY 180 tablet 0    atorvastatin (LIPITOR) 10 MG tablet Take 1 tablet by mouth daily 30 tablet 2    naproxen (NAPROSYN) 500 MG tablet Take 1 tablet by mouth 2 times daily as needed for Pain 30 tablet 0    meloxicam (MOBIC) 15 MG tablet i po qd PRN 90 tablet 0    therapeutic multivitamin-minerals (THERAGRAN-M) tablet Take 1 tablet by mouth daily. Indications: OTC       No current facility-administered medications for this visit. Social History:   Social History     Socioeconomic History    Marital status:      Spouse name: Not on file    Number of children: Not on file    Years of education: Not on file    Highest education level: Not on file   Occupational History    Not on file   Social Needs    Financial resource strain: Not on file    Food insecurity:     Worry: Not on file     Inability: Not on file    Transportation needs:     Medical: Not on file     Non-medical: Not on file   Tobacco Use    Smoking status: Never Smoker    Smokeless tobacco: Current User     Types: Chew    Tobacco comment: chew    Substance and Sexual Activity    Alcohol use:  Yes Alcohol/week: 0.0 oz     Comment: monthly-1-2 beers    Drug use: No    Sexual activity: Yes   Lifestyle    Physical activity:     Days per week: Not on file     Minutes per session: Not on file    Stress: Not on file   Relationships    Social connections:     Talks on phone: Not on file     Gets together: Not on file     Attends Sabianist service: Not on file     Active member of club or organization: Not on file     Attends meetings of clubs or organizations: Not on file     Relationship status: Not on file    Intimate partner violence:     Fear of current or ex partner: Not on file     Emotionally abused: Not on file     Physically abused: Not on file     Forced sexual activity: Not on file   Other Topics Concern    Not on file   Social History Narrative    Not on file       TOBACCO:   reports that he has never smoked. His smokeless tobacco use includes chew. ETOH:   reports that he drinks alcohol. Family History:   Family History   Problem Relation Age of Onset    Cancer Father     Substance Abuse Father          A:  Mr. Diallo Lagunas has a longstanding history of anxiety and depression. He was active and engaged during the visit. Responded positively to behavioral interventions. NeuroQuestight results reviewed and treatment planning discussed extensively.     PHQ Scores 4/3/2019 2/19/2018 10/5/2016   PHQ2 Score 5 0 1   PHQ9 Score 14 0 1     Interpretation of Total Score Depression Severity: 1-4 = Minimal depression, 5-9 = Mild depression, 10-14 = Moderate depression, 15-19 = Moderately severe depression, 20-27 = Severe depression        Diagnosis:    MDD, Recurrent, Moderate  KATHI        Plan:  Pt interventions:  Established rapport, Conducted functional assessment, [place]-setting to identify pt's primary goals for [facility] visit / overall health, Supportive techniques, Emphasized self-care as important for managing overall health and Provided Psychoeducation re: depression, anxiety, treatment available, role of medication in treatment, downward spiral of depression  genesight testing, and taught about the role of sleep, exercise, diet, physical illness, and drug/etoh use on mood, treatment planning, consult with PCP    Pt Behavioral Change Plan:  Pt set goals to 1) make medication changes, per PCP rx 2) work on implementing a regular sleep schedule to avoid oversleeping 3) return for f/u in one week

## 2019-04-10 ENCOUNTER — OFFICE VISIT (OUTPATIENT)
Dept: PSYCHOLOGY | Age: 49
End: 2019-04-10
Payer: COMMERCIAL

## 2019-04-10 DIAGNOSIS — F51.01 PRIMARY INSOMNIA: ICD-10-CM

## 2019-04-10 DIAGNOSIS — F41.1 GAD (GENERALIZED ANXIETY DISORDER): ICD-10-CM

## 2019-04-10 DIAGNOSIS — F33.1 MODERATE EPISODE OF RECURRENT MAJOR DEPRESSIVE DISORDER (HCC): Primary | ICD-10-CM

## 2019-04-10 PROCEDURE — 90832 PSYTX W PT 30 MINUTES: CPT | Performed by: PSYCHOLOGIST

## 2019-04-10 ASSESSMENT — PATIENT HEALTH QUESTIONNAIRE - PHQ9
9. THOUGHTS THAT YOU WOULD BE BETTER OFF DEAD, OR OF HURTING YOURSELF: 0
4. FEELING TIRED OR HAVING LITTLE ENERGY: 3
5. POOR APPETITE OR OVEREATING: 1
SUM OF ALL RESPONSES TO PHQ QUESTIONS 1-9: 12
SUM OF ALL RESPONSES TO PHQ QUESTIONS 1-9: 12
6. FEELING BAD ABOUT YOURSELF - OR THAT YOU ARE A FAILURE OR HAVE LET YOURSELF OR YOUR FAMILY DOWN: 1
7. TROUBLE CONCENTRATING ON THINGS, SUCH AS READING THE NEWSPAPER OR WATCHING TELEVISION: 0
1. LITTLE INTEREST OR PLEASURE IN DOING THINGS: 2
10. IF YOU CHECKED OFF ANY PROBLEMS, HOW DIFFICULT HAVE THESE PROBLEMS MADE IT FOR YOU TO DO YOUR WORK, TAKE CARE OF THINGS AT HOME, OR GET ALONG WITH OTHER PEOPLE: 3
SUM OF ALL RESPONSES TO PHQ9 QUESTIONS 1 & 2: 3
8. MOVING OR SPEAKING SO SLOWLY THAT OTHER PEOPLE COULD HAVE NOTICED. OR THE OPPOSITE, BEING SO FIGETY OR RESTLESS THAT YOU HAVE BEEN MOVING AROUND A LOT MORE THAN USUAL: 1
3. TROUBLE FALLING OR STAYING ASLEEP: 3
2. FEELING DOWN, DEPRESSED OR HOPELESS: 1

## 2019-04-10 ASSESSMENT — ANXIETY QUESTIONNAIRES
1. FEELING NERVOUS, ANXIOUS, OR ON EDGE: 2-OVER HALF THE DAYS
3. WORRYING TOO MUCH ABOUT DIFFERENT THINGS: 2-OVER HALF THE DAYS
4. TROUBLE RELAXING: 2-OVER HALF THE DAYS
2. NOT BEING ABLE TO STOP OR CONTROL WORRYING: 2-OVER HALF THE DAYS
5. BEING SO RESTLESS THAT IT IS HARD TO SIT STILL: 2-OVER HALF THE DAYS
6. BECOMING EASILY ANNOYED OR IRRITABLE: 2-OVER HALF THE DAYS
GAD7 TOTAL SCORE: 13
7. FEELING AFRAID AS IF SOMETHING AWFUL MIGHT HAPPEN: 1-SEVERAL DAYS

## 2019-04-17 ENCOUNTER — OFFICE VISIT (OUTPATIENT)
Dept: PSYCHOLOGY | Age: 49
End: 2019-04-17
Payer: COMMERCIAL

## 2019-04-17 DIAGNOSIS — G47.00 INSOMNIA, UNSPECIFIED TYPE: ICD-10-CM

## 2019-04-17 DIAGNOSIS — F33.1 MODERATE EPISODE OF RECURRENT MAJOR DEPRESSIVE DISORDER (HCC): Primary | ICD-10-CM

## 2019-04-17 PROCEDURE — 90832 PSYTX W PT 30 MINUTES: CPT | Performed by: PSYCHOLOGIST

## 2019-04-17 ASSESSMENT — PATIENT HEALTH QUESTIONNAIRE - PHQ9
8. MOVING OR SPEAKING SO SLOWLY THAT OTHER PEOPLE COULD HAVE NOTICED. OR THE OPPOSITE, BEING SO FIGETY OR RESTLESS THAT YOU HAVE BEEN MOVING AROUND A LOT MORE THAN USUAL: 1
10. IF YOU CHECKED OFF ANY PROBLEMS, HOW DIFFICULT HAVE THESE PROBLEMS MADE IT FOR YOU TO DO YOUR WORK, TAKE CARE OF THINGS AT HOME, OR GET ALONG WITH OTHER PEOPLE: 2
2. FEELING DOWN, DEPRESSED OR HOPELESS: 2
9. THOUGHTS THAT YOU WOULD BE BETTER OFF DEAD, OR OF HURTING YOURSELF: 0
SUM OF ALL RESPONSES TO PHQ9 QUESTIONS 1 & 2: 4
5. POOR APPETITE OR OVEREATING: 0
1. LITTLE INTEREST OR PLEASURE IN DOING THINGS: 2
6. FEELING BAD ABOUT YOURSELF - OR THAT YOU ARE A FAILURE OR HAVE LET YOURSELF OR YOUR FAMILY DOWN: 0
4. FEELING TIRED OR HAVING LITTLE ENERGY: 2
SUM OF ALL RESPONSES TO PHQ QUESTIONS 1-9: 9
SUM OF ALL RESPONSES TO PHQ QUESTIONS 1-9: 9
3. TROUBLE FALLING OR STAYING ASLEEP: 2
7. TROUBLE CONCENTRATING ON THINGS, SUCH AS READING THE NEWSPAPER OR WATCHING TELEVISION: 0

## 2019-04-17 ASSESSMENT — ANXIETY QUESTIONNAIRES
5. BEING SO RESTLESS THAT IT IS HARD TO SIT STILL: 1-SEVERAL DAYS
6. BECOMING EASILY ANNOYED OR IRRITABLE: 1-SEVERAL DAYS
GAD7 TOTAL SCORE: 7
4. TROUBLE RELAXING: 1-SEVERAL DAYS
3. WORRYING TOO MUCH ABOUT DIFFERENT THINGS: 1-SEVERAL DAYS
2. NOT BEING ABLE TO STOP OR CONTROL WORRYING: 1-SEVERAL DAYS
7. FEELING AFRAID AS IF SOMETHING AWFUL MIGHT HAPPEN: 1-SEVERAL DAYS
1. FEELING NERVOUS, ANXIOUS, OR ON EDGE: 1-SEVERAL DAYS

## 2019-04-17 NOTE — PROGRESS NOTES
5    clonazePAM (KLONOPIN) 0.5 MG tablet TAKE 1 TABLET BY MOUTH TWO TIMES A DAY AS NEEDED FOR ANXIETY 180 tablet 0    atorvastatin (LIPITOR) 10 MG tablet Take 1 tablet by mouth daily 30 tablet 2    naproxen (NAPROSYN) 500 MG tablet Take 1 tablet by mouth 2 times daily as needed for Pain 30 tablet 0    meloxicam (MOBIC) 15 MG tablet i po qd PRN 90 tablet 0    therapeutic multivitamin-minerals (THERAGRAN-M) tablet Take 1 tablet by mouth daily. Indications: OTC       No current facility-administered medications for this visit. Social History:   Social History     Socioeconomic History    Marital status:      Spouse name: Not on file    Number of children: Not on file    Years of education: Not on file    Highest education level: Not on file   Occupational History    Not on file   Social Needs    Financial resource strain: Not on file    Food insecurity:     Worry: Not on file     Inability: Not on file    Transportation needs:     Medical: Not on file     Non-medical: Not on file   Tobacco Use    Smoking status: Never Smoker    Smokeless tobacco: Current User     Types: Chew    Tobacco comment: chew    Substance and Sexual Activity    Alcohol use:  Yes     Alcohol/week: 0.0 oz     Comment: monthly-1-2 beers    Drug use: No    Sexual activity: Yes   Lifestyle    Physical activity:     Days per week: Not on file     Minutes per session: Not on file    Stress: Not on file   Relationships    Social connections:     Talks on phone: Not on file     Gets together: Not on file     Attends Protestant service: Not on file     Active member of club or organization: Not on file     Attends meetings of clubs or organizations: Not on file     Relationship status: Not on file    Intimate partner violence:     Fear of current or ex partner: Not on file     Emotionally abused: Not on file     Physically abused: Not on file     Forced sexual activity: Not on file   Other Topics Concern    Not on file Social History Narrative    Not on file       TOBACCO:   reports that he has never smoked. His smokeless tobacco use includes chew. ETOH:   reports that he drinks alcohol. Family History:   Family History   Problem Relation Age of Onset    Cancer Father     Substance Abuse Father          A:  Mr. Hiro Garcia has been working on improving insomnia for mood management. His sleep has improved significant and will continue to work on optimizing sleep and decreasing use of sleep aids. Mood has improved with improve sleep. He continues to be active and engaged responds positively to behavioral interventions. PHQ Scores 4/17/2019 4/10/2019 4/3/2019 2/19/2018 10/5/2016   PHQ2 Score 4 3 5 0 1   PHQ9 Score 9 12 14 0 1     Interpretation of Total Score Depression Severity: 1-4 = Minimal depression, 5-9 = Mild depression, 10-14 = Moderate depression, 15-19 = Moderately severe depression, 20-27 = Severe depression    KATHI 7 SCORE 4/17/2019 4/10/2019 4/3/2019   KATHI-7 Total Score 7 13 11     Interpretation of KATHI-7 score: 5-9 = mild anxiety, 10-14 = moderate anxiety, 15+ = severe anxiety. Recommend referral to behavioral health for scores 10 or greater.       Diagnosis:    MDD, Recurrent, Moderate  KATHI  Insomnia      Plan:  Pt interventions:  [place]-setting to identify pt's primary goals for [facility] visit / overall health and Supportive techniques  CBT-i interventions focusing on sleep education, weaning off sleep aids and reinforced pt's skill use, treatment planning    Pt Behavioral Change Plan:  Pt set goals to 1) continue keeping a sleep log 2) maintain all behavior changes like wind down before bed, no caffeine after 4pm, no chewing tobacco before bed, no TV in bed 3) discontinue ibuprofen PM at night 4) return for f/u in one week

## 2019-04-24 ENCOUNTER — OFFICE VISIT (OUTPATIENT)
Dept: PSYCHOLOGY | Age: 49
End: 2019-04-24
Payer: COMMERCIAL

## 2019-04-24 DIAGNOSIS — F33.1 MODERATE EPISODE OF RECURRENT MAJOR DEPRESSIVE DISORDER (HCC): Primary | ICD-10-CM

## 2019-04-24 PROCEDURE — 90832 PSYTX W PT 30 MINUTES: CPT | Performed by: PSYCHOLOGIST

## 2019-04-24 ASSESSMENT — ANXIETY QUESTIONNAIRES
3. WORRYING TOO MUCH ABOUT DIFFERENT THINGS: 2-OVER HALF THE DAYS
2. NOT BEING ABLE TO STOP OR CONTROL WORRYING: 2-OVER HALF THE DAYS
5. BEING SO RESTLESS THAT IT IS HARD TO SIT STILL: 1-SEVERAL DAYS
4. TROUBLE RELAXING: 2-OVER HALF THE DAYS
6. BECOMING EASILY ANNOYED OR IRRITABLE: 1-SEVERAL DAYS
1. FEELING NERVOUS, ANXIOUS, OR ON EDGE: 0-NOT AT ALL SURE
7. FEELING AFRAID AS IF SOMETHING AWFUL MIGHT HAPPEN: 1-SEVERAL DAYS
GAD7 TOTAL SCORE: 9

## 2019-04-24 ASSESSMENT — PATIENT HEALTH QUESTIONNAIRE - PHQ9
5. POOR APPETITE OR OVEREATING: 0
9. THOUGHTS THAT YOU WOULD BE BETTER OFF DEAD, OR OF HURTING YOURSELF: 0
SUM OF ALL RESPONSES TO PHQ QUESTIONS 1-9: 6
SUM OF ALL RESPONSES TO PHQ QUESTIONS 1-9: 6
1. LITTLE INTEREST OR PLEASURE IN DOING THINGS: 2
6. FEELING BAD ABOUT YOURSELF - OR THAT YOU ARE A FAILURE OR HAVE LET YOURSELF OR YOUR FAMILY DOWN: 0
SUM OF ALL RESPONSES TO PHQ9 QUESTIONS 1 & 2: 3
8. MOVING OR SPEAKING SO SLOWLY THAT OTHER PEOPLE COULD HAVE NOTICED. OR THE OPPOSITE, BEING SO FIGETY OR RESTLESS THAT YOU HAVE BEEN MOVING AROUND A LOT MORE THAN USUAL: 1
7. TROUBLE CONCENTRATING ON THINGS, SUCH AS READING THE NEWSPAPER OR WATCHING TELEVISION: 0
10. IF YOU CHECKED OFF ANY PROBLEMS, HOW DIFFICULT HAVE THESE PROBLEMS MADE IT FOR YOU TO DO YOUR WORK, TAKE CARE OF THINGS AT HOME, OR GET ALONG WITH OTHER PEOPLE: 1
3. TROUBLE FALLING OR STAYING ASLEEP: 0
4. FEELING TIRED OR HAVING LITTLE ENERGY: 2
2. FEELING DOWN, DEPRESSED OR HOPELESS: 1

## 2019-04-24 NOTE — PATIENT INSTRUCTIONS
List Of Pleasurable Activities  Depression tried to fool us into thinking that we can rest our way out of depression, but this couldn't be farther from the truth. This is one of the evil tricks of depression. To combat this the pleasurable activities list can provide options of things to do to get people doing something/anything. Your assignment is to pick an activity that you are going to try each day either from this list or perhaps reading this list has reminded you of something else you can do. It can be a different activity each day or a different one each day. Once you have selected something then rate your mood before and after you do the activity using a scale (0-10, where 0 is the worst you have ever felt and 10 is the best you ever felt, or smiley face to frowning face, or whatever makes sense to you). These activities are not meant to be a cure, but will help you combat the trick of depression that tries to convince you that you can rest your way out of depression. 1. Set aside a day with nothing to do  2. Acting  3. Apply fresh deodorant/antiperspirant   4. Arranging flowers  5. Ask a friend to play an instrument for you  6. Ask a friend to sing to you or with you  7. Attend a Judaism service  8. Attend hearings in public courts   9. Bake fresh bread or brownies  10. Bake goodies for someone  6. Being alone  12. Build a fire in your fireplace and notice the smell 13. Build a model  14. Burn incense or scented candles  15. Buy a noise machine with nature sounds  16. Buy a decorative centerpiece  17. Buy a gift certificate for someone else  25. Buy and look at a beautiful painting, print, or poster  19. Buy someone a subscription to their favorite publication  21. Buying books  24. Buying clothes  22. Buying gifts  21. Buying household gadgets  24. Buying music  25. Buying things for myself (perfume, golf balls, etc. )  26. Call a friend  32. Call a toll-free line to hear a human voice  28.  Call a weather, time, and temperature line  29. Call joke lines  30. Chew your favorite gum, or try a new one  31. Chop wood  32. Clean your home  33. Cleaning  34. Collecting free travel brochures and looking at them  35. Collecting old things    39. Collecting shells  37. Collecting things (coins, shells, etc. )    38. Completing a task  39. Cooking  40. Dancing  41. Daydreaming  42. Debate an issue with someone  37. Discussing books with others  40. Do volunteer work ([address], hospital, etc. )  45. Do yoga  46. Do your homework  47. Doing arts and crafts  50. Doing crossword puzzles or suduko  49. Doing needlepoint or hook-rugging  50. Doing something new  51. Doing something spontaneously  52. Doing woodworking 53. Donate money to a cause you believe in  47. Doodling  55. Dressing up and looking nice  56. Drink flavored milk  57. Drink herbal tea  58. Drink warm milk  59. Drive across a prairie or up a mountain  60. Drive or walk around your town and look at architecture  61. Driving  62. Early morning coffee and newspaper  63. Eat hot toast  64. Eat peppermint or cinnamon candy, slowly  65. Eating  66. Entertaining  67. Exercising  68. Fantasizing about the future  69. Flying in a plane  70. Flying kites  71. Gambling  72. Gardening  73. Get a massage  74. Getting out of (paying on) debt  75. Give or get a back rub  76. Go for a swim  77. Go look at the ocean  78. Go on a ferry ride  79. Go on a train  80. Go to a bakery or café and stand around, taking in the smells  81. Go to a museum  82. Go to the zoo and look at the animals  83. Go to wooded area and notice the smells  84. Go window-shopping  85. Go bike riding  86. Go bowling  87. Go camping  88. Go fishing  89. Go for a drive  90. Go hiking  91. Go home from work  92. Go horseback riding  93. Go on a picnic  94. Go on vacation  95. Go out to dinner  96. Go sail-boating  97. Go skating  98. Go skiing  99. Go swimming  100.  Go to a movie in the middle of the week  101. Go to a party  102. Go to a spectator sport (baseball, basketball, tennis, soccer, auto racing, horse racing)  103. Go to museums  104. Go to plays and concerts  471. Go to the beach  106. Go to the beauty parlor  107. Go to the mountains  108. Hang pictures on your walls  109. Have a bowl of your favorite soup  110. Have a friend read to you  111. Have an ice cream cone or make an ice cream sundae  112. Have some heated water with lemon squeezed into it  113. Have a political discussion  114. Have an aquarium  115. Have a class reunions  116. Have discussions with friends  [phone]. Have family get-togethers  80. Have lunch with a friend  119. Have quiet evenings  120. Hobbies (stamp collecting, model building, etc. )  121. Hold hands  122. Hug someone  123. Hum a tune  124. Jogging, walking  125. Knitting  126. Landscape  127. Laughing  128. Lighting candles  129. Listen to affirmation tapes  130. Listen to books on tape  131. Listen to classical music  132. Listen to emotional music such as anthems, hymnals, fight songs, or anything uplifting  133. Listen to mellow instrumental music  134. Listen to relaxation or meditation tapes  135. Listening to a stereo  136. Listening to music    137. Listening to others  138. Listening to stand-up comedy routines  139. Listening to the radio  140. Look at a piece of art and try to understand the artists conception  141. Look at art or photography books  142. Look at magazines  143. Look at photo books or magazines  144. Look at rivers, ponds, or fountains  145. Look at shop window displays  146. Look at trees, grass, or plants  147. Losing weight  148. Lying in the sun  149. Make a card from scratch and send it  150. Make a list of people you want to send holiday cards to  151. Make a meal for a friend or a loved one  80. Make a salad with green leafy lettuce, green and black olives, onions, and feta cheese  153. Make a to-do list  154.  Make a gift for someone 155. Make Jigsaw puzzle  156. Make a list of tasks  157. Massage your hand, foot, arm, or leg  158. Meditating  159. Meet a friend for a game of chess or backgammon  160. Meet someone for a meal, and pay the check when they dont expect it  161. Meeting new people  162. Memorize a poem or quotations  163. Memorize and recite prayers, poetry, or songs    164. Memorize facts about topics that interest you  165. Notice how the wind feels blowing across your face and body  166. Notice the smell of freshly cut grass  167. Organize your closet  168. [place] a room in your house a soothing color  169. Painting  170. Photography  171. Pick flowers for someone  172. Plan for the future  173. Plan a career change  174. Plan a days activities  175. Plan my career  176. Plan a party  177. Plan to go to school  178. Play a musical instrument  179. Play computer games  180. Play solitaire  181. Play cards  182. Play golf  183. Play guitar or other musical instrument  184. Play musical instruments  185. Play soccer  186. Play softball  187. Play tennis  188. Play the imaginary drums or darbuka  189. Playing volleyball  190. Playing with animals  191. Practice a foreign language  192. Practicing karate, judo, yoga  193. Practicing Orthodoxy (going to Sikhism, group praying, etc. )  194. [place] prayers for the well-being of others  195. Put change in an  parking meter  196. Put clean sheets on your bed and climb in  197. Put on cologne or perfume  198. Put up seasonal decorations  199. Read a book  200. Read a suspenseful novel or mystery  201. Read biographies  56. Read emotional books or stories that trigger different emotions  203. Read funny greeting cards  204. Read how-to books  205. Read inspirational literature  206. Read joke books  207. Read out loud  208. Read Presybeterian and spiritual literature  209. Reading fiction  210. Reading magazines or newspapers  211. Reading nonfiction  212. Recalling past parties  [id number] Recycling old items    214. Refinishing furniture  215. Reflecting on how Ive improved    216. Relaxing  217. Remembering beautiful scenery  26. Remembering the words and deeds of loving people  26. Repairing things around the house  220. Riding a motorbike  221. Rub your temples and forehead  222. Running track  223. Sample foods at your local deli  224. Sample perfumes and colognes at local department store  225. Saving money  226. Saying I love you  227. Search the Internet for information about emotions  228. Seeing and/or showing photos or slides  229. Send a thank-you note to someone  230. Send e-mail  231. Send flowers anonymously  . Send out cards to loved ones  233. Sewing  234. Shooting pool  235. Sightseeing  236. Sing to yourself  237. Singing around the house  238. Singing with groups  239. Sitting in a sidewalk cafe  240. Sleeping  241. Slowly and mindfully drink a warm drink, feeling its warmth entering you  242. Slowly eat your favorite food, savoring every bite  243. Smell flowers  244. Smell fresh laundry  245. Soak your feet in warm water, a pool, or a stream  246. Soaking in the bathtub  247. Solving riddles mentally  248. Spending an evening with good friend  249. Splurging  250. Spray air freshener around your home  251. Squish your toes in mud  252. Start a petition for a cause or political issue you think is worthy  253. Staying on a diet    254. Take a bus ride  255. Take a friend to a spa  256. Take a long and luxurious bath  257. Take a long hot shower  258. Take inventory of your wardrobe  259. Take a sauna or a steam bath  260. Take ballet, tap dancing  261. Take care of my plants  262. Take children places  263. Talk on the phone  264. Thinking I did that pretty well after doing something  265. Think about becoming active in the community  266. Think about buying things  267. Think about getting     268. Think about having a family  56.  Think about my good qualities  270. Think about past trips  271. Think about pleasant events  272. Think about [facility]  273. Think how it will be when I finish school  274. Think I have a lot more going for me than most people  275. Think I have done a full days work  276. Think Im a person who can cope  277. Think Im an OK person  278. Think Tenriism thoughts  279. Thoughts about happy moments in my childhood  280. Throw a surprise birthday party  200. Tie a dollar bill to a helium balloon and release it into the rommel  282. Traveling abroad or in the [place]  283. Travel to national salinas  284. Try to recall the features of faces you havent seen in a while  285. Try to remember every detail of a beautiful day you had  286. Try to understand obscure poetry  287. Turn on a fan, air purifier, or anything else that makes white noise  288. Use air freshener plug-ins  289. Use the Internet to build a resources file  290. Visit shut-ins  291. Visit someone who is sick  0. Volunteer work  293. Walk barefoot through sand, mud, or grass  294. Walks in the woods (or at [employer])  295. Wash your hair with fruit-scented shampoo (strawberry, banana, etc. )  296. Watch a thunderstorm  297. Watch inspirational and emotional movies  298. Watch nature shows  299. Watch the sunrise or sunset  300. Watch children play  301. Watch sports         302. Watch TV  303. Whistle  304. White-water canoeing  305. Work crossword or jigsaw puzzles  306. Work logic problems  307. Work out with [employer]  308. Working  309. Working on my car or bicycle  310. Write a letter of reference    311. Write a letter to the   [id number]. Write a mission statement for your life  26. Write a note of appreciation or encouragement to someone you know  314. Write about the way you would like your life to be  315. Write letters to friends, family, politicians  [phone]. Write out your solution to a political or social problem  317.  Writing books, blogs, poems, letters to the , op-ed pieces, or articles  26. Writing diary entries or letters      ---------------------  Create a list of at least 20 pleasurable activities:    1. Fishing  2. Gardening  3.

## 2019-04-24 NOTE — PROGRESS NOTES
Behavioral Health Consultation  [address], [address] [place] [address]  Psychologist  4/24/2019  2:46 PM      Time spent with Patient:30 minutes  This is patient's fourth [facility] appointment. Reason for Consult:  Depression, anxiety  Referring Provider: Yon Redmond DO  [address]  [address], [address] [facility] [address]      Feedback given to PCP. S:  During the last visit pt set goals to 1) continue keeping a sleep log 2) maintain all behavior changes like wind down before bed, no caffeine after 4pm, no chewing tobacco before bed, no TV in bed 3) discontinue ibuprofen PM at night 4) return for f/u in one week     Pt reports sleep has improved. Shared sleep log. Had one bad night of sleep but that was due to napping that day. Hasn't taken sleep aids the past 6 nights. Is amazed at how easy it was to improve his sleep. No issues getting up in the morning now. Mood is hard to explain. Doesn't feel like doing much - lack of interest and motivation. Has some ideas but then more comfortable just laying and watching TV. Used to like playing sports, but got injured. Used to like gardening, fishing, hunting. Hasn't done these in 2-3 years. Also used to like working because he gets to fixs things. Enjoys fixing things, but now not enjoyable.         O:  MSE:    Appearance    alert, cooperative  Sleep disturbance No  Fatigue Yes  Loss of pleasure Yes  Impulsive behavior Yes  Speech    normal rate and normal volume  Mood    \"pretty good\"  Affect   Congruent to thought content and mood  Thought Content    intact  Thought Process    linear and goal directed  Associations    logical connections  Insight    Good  Judgment    Intact  Orientation    oriented to person, place, time, and general circumstances  Memory    recent and remote memory intact  Attention/Concentration    intact  Ability to understand instructions Yes  Ability to respond meaningfully Yes  Suicide Assessment    Denies SI      History:    Medications: Current Outpatient Medications   Medication Sig Dispense Refill    sertraline (ZOLOFT) 50 MG tablet 0.5 daily x 4 days then start 1 daily in AM 30 tablet 5    clonazePAM (KLONOPIN) 0.5 MG tablet TAKE 1 TABLET BY MOUTH TWO TIMES A DAY AS NEEDED FOR ANXIETY 180 tablet 0    atorvastatin (LIPITOR) 10 MG tablet Take 1 tablet by mouth daily 30 tablet 2    naproxen (NAPROSYN) 500 MG tablet Take 1 tablet by mouth 2 times daily as needed for Pain 30 tablet 0    meloxicam (MOBIC) 15 MG tablet i po qd PRN 90 tablet 0    therapeutic multivitamin-minerals (THERAGRAN-M) tablet Take 1 tablet by mouth daily. Indications: OTC       No current facility-administered medications for this visit. Social History:   Social History     Socioeconomic History    Marital status:      Spouse name: Not on file    Number of children: Not on file    Years of education: Not on file    Highest education level: Not on file   Occupational History    Not on file   Social Needs    Financial resource strain: Not on file    Food insecurity:     Worry: Not on file     Inability: Not on file    Transportation needs:     Medical: Not on file     Non-medical: Not on file   Tobacco Use    Smoking status: Never Smoker    Smokeless tobacco: Current User     Types: Chew    Tobacco comment: chew    Substance and Sexual Activity    Alcohol use:  Yes     Alcohol/week: 0.0 oz     Comment: monthly-1-2 beers    Drug use: No    Sexual activity: Yes   Lifestyle    Physical activity:     Days per week: Not on file     Minutes per session: Not on file    Stress: Not on file   Relationships    Social connections:     Talks on phone: Not on file     Gets together: Not on file     Attends Zoroastrian service: Not on file     Active member of club or organization: Not on file     Attends meetings of clubs or organizations: Not on file     Relationship status: Not on file    Intimate partner violence:     Fear of current or ex partner: Not on file     Emotionally abused: Not on file     Physically abused: Not on file     Forced sexual activity: Not on file   Other Topics Concern    Not on file   Social History Narrative    Not on file       TOBACCO:   reports that he has never smoked. His smokeless tobacco use includes chew. ETOH:   reports that he drinks alcohol. Family History:   Family History   Problem Relation Age of Onset    Cancer Father     Substance Abuse Father          A:  Mr. Yazmin Rizvi insomnia has resolved through CBT-i. He is also no longer taking any sleep aids. Depression has improved somewhat with improved sleep. He continues to be active and engaged responds positively to behavioral interventions. PHQ Scores 4/24/2019 4/17/2019 4/10/2019 4/3/2019 2/19/2018 10/5/2016   PHQ2 Score 3 4 3 5 0 1   PHQ9 Score 6 9 12 14 0 1     Interpretation of Total Score Depression Severity: 1-4 = Minimal depression, 5-9 = Mild depression, 10-14 = Moderate depression, 15-19 = Moderately severe depression, 20-27 = Severe depression    KATHI 7 SCORE 4/24/2019 4/17/2019 4/10/2019 4/3/2019   KATHI-7 Total Score 9 7 13 11     Interpretation of KATHI-7 score: 5-9 = mild anxiety, 10-14 = moderate anxiety, 15+ = severe anxiety. Recommend referral to behavioral health for scores 10 or greater.       Diagnosis:    MDD, Recurrent, Moderate  KATHI  Insomnia      Plan:  Pt interventions:  [place]-setting to identify pt's primary goals for [facility] visit / overall health and Supportive techniques  behavioral activation interventions, treatment planning and reinforced pt's skill use    Pt Behavioral Change Plan:  Pt set goals to 1) create a list of at least 20 pleasurable activities you used to enjoy 2) ask two friends to help hold you accountable with plans to fish or garden 3) fish or garden 1x/week for next 3 weeks - rate mood pre and post activitiy 4) maintain all sleep behavior changes 5) return for f/u in 3 weeks

## 2019-05-15 ENCOUNTER — OFFICE VISIT (OUTPATIENT)
Dept: PSYCHOLOGY | Age: 49
End: 2019-05-15
Payer: COMMERCIAL

## 2019-05-15 DIAGNOSIS — F33.1 MODERATE EPISODE OF RECURRENT MAJOR DEPRESSIVE DISORDER (HCC): ICD-10-CM

## 2019-05-15 DIAGNOSIS — F41.1 GAD (GENERALIZED ANXIETY DISORDER): Primary | ICD-10-CM

## 2019-05-15 PROCEDURE — 90832 PSYTX W PT 30 MINUTES: CPT | Performed by: PSYCHOLOGIST

## 2019-05-15 ASSESSMENT — ANXIETY QUESTIONNAIRES
GAD7 TOTAL SCORE: 10
5. BEING SO RESTLESS THAT IT IS HARD TO SIT STILL: 1-SEVERAL DAYS
7. FEELING AFRAID AS IF SOMETHING AWFUL MIGHT HAPPEN: 1-SEVERAL DAYS
4. TROUBLE RELAXING: 2-OVER HALF THE DAYS
1. FEELING NERVOUS, ANXIOUS, OR ON EDGE: 1-SEVERAL DAYS
2. NOT BEING ABLE TO STOP OR CONTROL WORRYING: 2-OVER HALF THE DAYS
6. BECOMING EASILY ANNOYED OR IRRITABLE: 1-SEVERAL DAYS
3. WORRYING TOO MUCH ABOUT DIFFERENT THINGS: 2-OVER HALF THE DAYS

## 2019-05-15 ASSESSMENT — PATIENT HEALTH QUESTIONNAIRE - PHQ9
1. LITTLE INTEREST OR PLEASURE IN DOING THINGS: 2
8. MOVING OR SPEAKING SO SLOWLY THAT OTHER PEOPLE COULD HAVE NOTICED. OR THE OPPOSITE, BEING SO FIGETY OR RESTLESS THAT YOU HAVE BEEN MOVING AROUND A LOT MORE THAN USUAL: 0
5. POOR APPETITE OR OVEREATING: 1
2. FEELING DOWN, DEPRESSED OR HOPELESS: 1
SUM OF ALL RESPONSES TO PHQ9 QUESTIONS 1 & 2: 3
4. FEELING TIRED OR HAVING LITTLE ENERGY: 2
10. IF YOU CHECKED OFF ANY PROBLEMS, HOW DIFFICULT HAVE THESE PROBLEMS MADE IT FOR YOU TO DO YOUR WORK, TAKE CARE OF THINGS AT HOME, OR GET ALONG WITH OTHER PEOPLE: 1
6. FEELING BAD ABOUT YOURSELF - OR THAT YOU ARE A FAILURE OR HAVE LET YOURSELF OR YOUR FAMILY DOWN: 0
SUM OF ALL RESPONSES TO PHQ QUESTIONS 1-9: 7
9. THOUGHTS THAT YOU WOULD BE BETTER OFF DEAD, OR OF HURTING YOURSELF: 0
7. TROUBLE CONCENTRATING ON THINGS, SUCH AS READING THE NEWSPAPER OR WATCHING TELEVISION: 0
SUM OF ALL RESPONSES TO PHQ QUESTIONS 1-9: 7
3. TROUBLE FALLING OR STAYING ASLEEP: 1

## 2019-05-15 NOTE — PROGRESS NOTES
Behavioral Health Consultation  [address]  Psychologist  5/15/2019  2:53 PM      Time spent with Patient:30 minutes  This is patient's fifth [facility] appointment. Reason for Consult:  Depression, anxiety  Referring Provider: DO Matt Lynn [id number]  [place], [address]      Feedback given to PCP. S:  During the last visit pt set goals to 1) create a list of at least 20 pleasurable activities you used to enjoy 2) ask two friends to help hold you accountable with plans to fish or garden 3) fish or garden 1x/week for next 3 weeks - rate mood pre and post activitiy 4) maintain all sleep behavior changes 5) return for f/u in 3 weeks    Sleep is going well. Some difficulties occasionally when going to sleep. Will get sleepy then brush teeth then get into bed and isn't as tired as he first felt. Did do a pleasurable activity once a week since last visit. Actually did much more than that. Did not keep a pre/post rating - didn't think he felt much better after. Did enjoy during many of the activities though. Has noticed anxiety is much more present - has always been has identified it as a big barrier to doing things. Went ziplining and was excited to do it but the anxiety around planning ruined much of the tina. Mood is overall \"blah. \" Not bad, not good. Anxiety is frustrating.         O:  MSE:    Appearance    alert, cooperative  Sleep disturbance No  Fatigue Yes  Loss of pleasure Yes  Impulsive behavior Yes  Speech    normal rate and normal volume  Mood    \"eh\"  Affect   Congruent to thought content and mood  Thought Content    intact  Thought Process    linear and goal directed  Associations    logical connections  Insight    Good  Judgment    Intact  Orientation    oriented to person, place, time, and general circumstances  Memory    recent and remote memory intact  Attention/Concentration    intact  Ability to understand instructions Yes  Ability to respond meaningfully Yes  Suicide Assessment    Denies SI      History:    Medications:   Current Outpatient Medications   Medication Sig Dispense Refill    sertraline (ZOLOFT) 50 MG tablet 0.5 daily x 4 days then start 1 daily in AM 30 tablet 5    clonazePAM (KLONOPIN) 0.5 MG tablet TAKE 1 TABLET BY MOUTH TWO TIMES A DAY AS NEEDED FOR ANXIETY 180 tablet 0    atorvastatin (LIPITOR) 10 MG tablet Take 1 tablet by mouth daily 30 tablet 2    naproxen (NAPROSYN) 500 MG tablet Take 1 tablet by mouth 2 times daily as needed for Pain 30 tablet 0    meloxicam (MOBIC) 15 MG tablet i po qd PRN 90 tablet 0    therapeutic multivitamin-minerals (THERAGRAN-M) tablet Take 1 tablet by mouth daily. Indications: OTC       No current facility-administered medications for this visit. Social History:   Social History     Socioeconomic History    Marital status:      Spouse name: Not on file    Number of children: Not on file    Years of education: Not on file    Highest education level: Not on file   Occupational History    Not on file   Social Needs    Financial resource strain: Not on file    Food insecurity:     Worry: Not on file     Inability: Not on file    Transportation needs:     Medical: Not on file     Non-medical: Not on file   Tobacco Use    Smoking status: Never Smoker    Smokeless tobacco: Current User     Types: Chew    Tobacco comment: chew    Substance and Sexual Activity    Alcohol use:  Yes     Alcohol/week: 0.0 oz     Comment: monthly-1-2 beers    Drug use: No    Sexual activity: Yes   Lifestyle    Physical activity:     Days per week: Not on file     Minutes per session: Not on file    Stress: Not on file   Relationships    Social connections:     Talks on phone: Not on file     Gets together: Not on file     Attends Buddhism service: Not on file     Active member of club or organization: Not on file     Attends meetings of clubs or organizations: Not on file     Relationship status: Not on file   [place]

## 2019-05-16 ENCOUNTER — TELEPHONE (OUTPATIENT)
Dept: PSYCHOLOGY | Age: 49
End: 2019-05-16

## 2019-05-16 RX ORDER — BUSPIRONE HYDROCHLORIDE 5 MG/1
TABLET ORAL
Qty: 60 TABLET | Refills: 0 | Status: SHIPPED | OUTPATIENT
Start: 2019-05-16 | End: 2019-06-28

## 2019-06-03 ENCOUNTER — OFFICE VISIT (OUTPATIENT)
Dept: FAMILY MEDICINE CLINIC | Age: 49
End: 2019-06-03
Payer: COMMERCIAL

## 2019-06-03 VITALS
BODY MASS INDEX: 32.79 KG/M2 | HEIGHT: 69 IN | SYSTOLIC BLOOD PRESSURE: 148 MMHG | DIASTOLIC BLOOD PRESSURE: 90 MMHG | OXYGEN SATURATION: 98 % | HEART RATE: 78 BPM | WEIGHT: 221.4 LBS

## 2019-06-03 DIAGNOSIS — I10 DIASTOLIC HYPERTENSION: ICD-10-CM

## 2019-06-03 DIAGNOSIS — F41.9 ANXIETY: ICD-10-CM

## 2019-06-03 DIAGNOSIS — G47.9 SLEEP DISTURBANCE: Primary | ICD-10-CM

## 2019-06-03 PROCEDURE — 99214 OFFICE O/P EST MOD 30 MIN: CPT | Performed by: FAMILY MEDICINE

## 2019-06-03 RX ORDER — CLONAZEPAM 0.5 MG/1
TABLET ORAL
Qty: 180 TABLET | Refills: 0 | Status: SHIPPED | OUTPATIENT
Start: 2019-06-03 | End: 2019-09-05 | Stop reason: SDUPTHER

## 2019-06-03 ASSESSMENT — ENCOUNTER SYMPTOMS
BLOOD IN STOOL: 0
COUGH: 0
SHORTNESS OF BREATH: 0
ABDOMINAL PAIN: 0

## 2019-06-03 NOTE — PROGRESS NOTES
Subjective:      Patient ID: Elvira Elliott is a 51 y.o. male. HPI  In for check on Insomnia(seeing Rishabh-doing really well)--Anxiety(on Klonipin-started buspar 10 days ago--? Better)--HT(no meds-OK at home). Prior to Visit Medications :  Medication atorvastatin (LIPITOR) 10 MG tablet, Sig TAKE 1 TABLET BY MOUTH ONE TIME A DAY , Taking? Yes, Authorizing Provider David Stevens, DO    Medication busPIRone (BUSPAR) 5 MG tablet, Sig 1 daily x 4 days then 1 bid-call me 10 days, Taking? Yes, Authorizing Provider David Stevens, DO    Medication sertraline (ZOLOFT) 50 MG tablet, Sig 0.5 daily x 4 days then start 1 daily in AM, Taking? Yes, Authorizing Provider Ernie Stevens, DO    Medication therapeutic multivitamin-minerals (THERAGRAN-M) tablet, Sig Take 1 tablet by mouth daily. Indications: OTC, Taking? Yes, Authorizing Provider Rodrigo Caputo MD    Medication clonazePAM (KLONOPIN) 0.5 MG tablet, Sig TAKE 1 TABLET BY MOUTH TWO TIMES A DAY AS NEEDED FOR ANXIETY, Taking? , Authorizing Provider David Stevens, DO    Medication naproxen (NAPROSYN) 500 MG tablet, Sig Take 1 tablet by mouth 2 times daily as needed for Pain, Taking? , Authorizing Provider Eliz Mancia MD    Medication meloxicam (MOBIC) 15 MG tablet, Sig i po qd PRN, Taking? , Authorizing Provider Pb Phillips PA-C      Past Medical History:  No date: Anxiety  No date: Chronic low back pain      Comment:  level 5  4/9/2015: Degenerative disc disease, lumbar  No date: Hyperlipidemia  No date: Hypertension  4/3/2019: Moderate episode of recurrent major depressive disorder   (HCC)  No date: Sleep apnea      Comment:  uses c-pap        Review of Systems    Review of Systems   Constitutional: Negative for unexpected weight change. HENT: Negative for congestion and postnasal drip. Eyes: Negative for visual disturbance. Respiratory: Negative for cough and shortness of breath. Cardiovascular: Negative for chest pain and palpitations. Gastrointestinal: Negative for abdominal pain and blood in stool. Genitourinary: Negative for difficulty urinating and hematuria. Musculoskeletal: Positive for arthralgias. Neurological: Negative for tremors and headaches. Psychiatric/Behavioral: Positive for sleep disturbance. The patient is nervous/anxious. Objective:   Physical Exam      Physical Exam   Constitutional: He is oriented to person, place, and time. He appears well-developed and well-nourished. HENT:   Head: Normocephalic. Mouth/Throat: Oropharynx is clear and moist.   Eyes: Conjunctivae are normal.   Neck: Neck supple. Carotid bruit is not present. No thyromegaly present. Cardiovascular: Normal rate, regular rhythm and normal heart sounds. Pulmonary/Chest: Effort normal and breath sounds normal.   Abdominal: Soft. He exhibits no distension and no mass. There is no tenderness. Musculoskeletal: Normal range of motion. He exhibits no edema. Lymphadenopathy:     He has no cervical adenopathy. Neurological: He is alert and oriented to person, place, and time. Skin: Skin is warm and dry. Psychiatric: He has a normal mood and affect. His behavior is normal. Judgment and thought content normal.       Assessment:       Diagnosis Orders   1. Sleep disturbance     2. Anxiety     3. Diastolic hypertension           Plan:      Ann-Marie Garrett was seen today for 3 month follow-up.     Diagnoses and all orders for this visit:    Sleep disturbance  Continue rec from [address] 10 twice a day  Diastolic hypertension  LUAN diet-check at home    See me 3 eber [address], DO

## 2019-06-28 RX ORDER — BUSPIRONE HYDROCHLORIDE 10 MG/1
10 TABLET ORAL 2 TIMES DAILY
Qty: 60 TABLET | Refills: 1 | Status: SHIPPED | OUTPATIENT
Start: 2019-06-28 | End: 2019-07-28

## 2019-09-05 ENCOUNTER — OFFICE VISIT (OUTPATIENT)
Dept: FAMILY MEDICINE CLINIC | Age: 49
End: 2019-09-05
Payer: COMMERCIAL

## 2019-09-05 VITALS
WEIGHT: 214 LBS | HEART RATE: 71 BPM | BODY MASS INDEX: 31.7 KG/M2 | SYSTOLIC BLOOD PRESSURE: 135 MMHG | OXYGEN SATURATION: 98 % | HEIGHT: 69 IN | DIASTOLIC BLOOD PRESSURE: 80 MMHG

## 2019-09-05 DIAGNOSIS — F41.9 ANXIETY: Primary | ICD-10-CM

## 2019-09-05 DIAGNOSIS — I10 DIASTOLIC HYPERTENSION: ICD-10-CM

## 2019-09-05 PROCEDURE — 90471 IMMUNIZATION ADMIN: CPT | Performed by: FAMILY MEDICINE

## 2019-09-05 PROCEDURE — 99214 OFFICE O/P EST MOD 30 MIN: CPT | Performed by: FAMILY MEDICINE

## 2019-09-05 PROCEDURE — 90686 IIV4 VACC NO PRSV 0.5 ML IM: CPT | Performed by: FAMILY MEDICINE

## 2019-09-05 RX ORDER — CLONAZEPAM 0.5 MG/1
TABLET ORAL
Qty: 180 TABLET | Refills: 0 | Status: SHIPPED | OUTPATIENT
Start: 2019-09-05 | End: 2019-12-04 | Stop reason: SDUPTHER

## 2019-09-05 ASSESSMENT — ENCOUNTER SYMPTOMS
BLOOD IN STOOL: 0
COUGH: 0
SHORTNESS OF BREATH: 0
ABDOMINAL PAIN: 0
BACK PAIN: 1

## 2019-09-05 NOTE — PATIENT INSTRUCTIONS
Anxiety  -     clonazePAM (KLONOPIN) 0.5 MG tablet; 1 bid prn anxiety  Continue medications as directed and try to reduce doses when possible. Diastolic hypertension  Continue weight loss and no added salt diet. Other orders  -     INFLUENZA, QUADV, 3 YRS AND OLDER, IM PF, PREFILL SYR OR SDV, 0.5ML (AFLURIA QUADV, PF)  See me in 3 months.

## 2019-12-04 DIAGNOSIS — F41.9 ANXIETY: ICD-10-CM

## 2019-12-04 RX ORDER — CLONAZEPAM 0.5 MG/1
TABLET ORAL
Qty: 180 TABLET | Refills: 0 | Status: SHIPPED | OUTPATIENT
Start: 2019-12-04 | End: 2020-03-03

## 2020-01-22 ENCOUNTER — OFFICE VISIT (OUTPATIENT)
Dept: FAMILY MEDICINE CLINIC | Age: 50
End: 2020-01-22
Payer: COMMERCIAL

## 2020-01-22 VITALS
OXYGEN SATURATION: 98 % | WEIGHT: 209.6 LBS | SYSTOLIC BLOOD PRESSURE: 130 MMHG | HEART RATE: 81 BPM | BODY MASS INDEX: 31.04 KG/M2 | DIASTOLIC BLOOD PRESSURE: 80 MMHG | HEIGHT: 69 IN

## 2020-01-22 PROCEDURE — 99213 OFFICE O/P EST LOW 20 MIN: CPT | Performed by: FAMILY MEDICINE

## 2020-01-22 ASSESSMENT — ENCOUNTER SYMPTOMS
DIARRHEA: 1
SHORTNESS OF BREATH: 0

## 2020-01-22 NOTE — PATIENT INSTRUCTIONS
Anxiety  Continue medications and try and reduce when possible. Stay as active as possible  Colon cancer screening  -     COLONOSCOPY (Screening)  -     Flavio Auguste MD, Gastroenterology, [facility]  For for initial colonoscopy-no family history of colon cancer. See me 3 months.         Kelsy Puri

## 2020-01-22 NOTE — PROGRESS NOTES
Subjective:      Patient ID: Tracy Gan is a 47 y.o. male. HPI  For 3-month checkup on anxiety. Medications working well. He is still doing very well with his insomnia as long as he continues doing what Rishabh  instructed him to do when he saw her. Prior to Visit Medications :  Medication clonazePAM (KLONOPIN) 0.5 MG tablet, Sig TAKE 1 TABLET BY MOUTH TWO TIMES A DAY AS NEEDED FOR ANXIETY, Taking? Yes, Authorizing Provider David Stevens, DO    Medication sertraline (ZOLOFT) 50 MG tablet, Sig 1 daily in AM, Taking? Yes, Authorizing Provider David Stevens, DO    Medication atorvastatin (LIPITOR) 10 MG tablet, Sig TAKE 1 TABLET BY MOUTH ONE TIME A DAY , Taking? Yes, Authorizing Provider Darin Stevens, DO    Medication therapeutic multivitamin-minerals (THERAGRAN-M) tablet, Sig Take 1 tablet by mouth daily. Indications: OTC, Taking? Yes, Authorizing Provider Historical Provider, MD      Past Medical History:  No date: Anxiety  No date: Chronic low back pain      Comment:  level 5  4/9/2015: Degenerative disc disease, lumbar  No date: Hyperlipidemia  No date: Hypertension  4/3/2019: Moderate episode of recurrent major depressive disorder   (HCC)  No date: Sleep apnea      Comment:  uses c-pap        Review of Systems    Review of Systems   Constitutional:        Lost a few pounds but he has had an intestinal virus for the last week which is getting better. Respiratory: Negative for shortness of breath. Cardiovascular: Negative for chest pain and palpitations. Gastrointestinal: Positive for diarrhea. Neurological: Negative for headaches. Psychiatric/Behavioral: Positive for sleep disturbance. The patient is nervous/anxious. Objective:   Physical Exam      Physical Exam  Constitutional:       Appearance: Normal appearance. Cardiovascular:      Rate and Rhythm: Normal rate and regular rhythm. Heart sounds: Normal heart sounds.    Pulmonary:      Effort: Pulmonary effort is normal. Breath sounds: Normal breath sounds. Abdominal:      Palpations: Abdomen is soft. Neurological:      Mental Status: He is alert and oriented to person, place, and time. Psychiatric:         Mood and Affect: Mood normal.         Behavior: Behavior normal.         Thought Content: Thought content normal.         Judgment: Judgment normal.         Assessment:       Diagnosis Orders   1. Anxiety     2. Colon cancer screening  COLONOSCOPY (Screening)    Hoda Stephenson MD, Gastroenterology, [facility]         Plan:      Henok Leigh was seen today for check-up and discuss medications. Diagnoses and all orders for this visit:    Anxiety  Continue medications and try and reduce when possible. Stay as active as possible  Colon cancer screening  -     COLONOSCOPY (Screening)  -     Hoda Stephenson MD, Gastroenterology, [facility]  For for initial colonoscopy-no family history of colon cancer. See me 3 months.         David Stevens, DO

## 2020-01-23 ENCOUNTER — TELEPHONE (OUTPATIENT)
Dept: GASTROENTEROLOGY | Age: 50
End: 2020-01-23

## 2020-01-27 RX ORDER — POLYETHYLENE GLYCOL 3350 17 G/17G
POWDER, FOR SOLUTION ORAL
Qty: 238 G | Refills: 0 | Status: ON HOLD | OUTPATIENT
Start: 2020-01-27 | End: 2020-02-04 | Stop reason: ALTCHOICE

## 2020-02-04 ENCOUNTER — HOSPITAL ENCOUNTER (OUTPATIENT)
Age: 50
Setting detail: OUTPATIENT SURGERY
Discharge: HOME | End: 2020-02-04
Attending: INTERNAL MEDICINE | Admitting: INTERNAL MEDICINE
Payer: COMMERCIAL

## 2020-02-04 VITALS
HEIGHT: 69 IN | DIASTOLIC BLOOD PRESSURE: 73 MMHG | OXYGEN SATURATION: 95 % | HEART RATE: 65 BPM | BODY MASS INDEX: 29.62 KG/M2 | TEMPERATURE: 97.6 F | RESPIRATION RATE: 16 BRPM | WEIGHT: 200 LBS | SYSTOLIC BLOOD PRESSURE: 110 MMHG

## 2020-02-04 PROBLEM — Z12.11 SCREEN FOR COLON CANCER: Status: ACTIVE | Noted: 2020-02-04

## 2020-02-04 PROCEDURE — 3609010600 HC COLONOSCOPY POLYPECTOMY SNARE/COLD BIOPSY: Performed by: INTERNAL MEDICINE

## 2020-02-04 PROCEDURE — 3609009900 HC COLONOSCOPY W/CONTROL BLEEDING ANY METHOD: Performed by: INTERNAL MEDICINE

## 2020-02-04 PROCEDURE — 99152 MOD SED SAME PHYS/QHP 5/>YRS: CPT | Performed by: INTERNAL MEDICINE

## 2020-02-04 PROCEDURE — 2720000010 HC SURG SUPPLY STERILE: Performed by: INTERNAL MEDICINE

## 2020-02-04 PROCEDURE — 6360000002 HC RX W HCPCS: Performed by: INTERNAL MEDICINE

## 2020-02-04 PROCEDURE — 2580000003 HC RX 258: Performed by: INTERNAL MEDICINE

## 2020-02-04 PROCEDURE — 7100000011 HC PHASE II RECOVERY - ADDTL 15 MIN: Performed by: INTERNAL MEDICINE

## 2020-02-04 PROCEDURE — 99153 MOD SED SAME PHYS/QHP EA: CPT | Performed by: INTERNAL MEDICINE

## 2020-02-04 PROCEDURE — 88305 TISSUE EXAM BY PATHOLOGIST: CPT

## 2020-02-04 PROCEDURE — 45385 COLONOSCOPY W/LESION REMOVAL: CPT | Performed by: INTERNAL MEDICINE

## 2020-02-04 PROCEDURE — 7100000010 HC PHASE II RECOVERY - FIRST 15 MIN: Performed by: INTERNAL MEDICINE

## 2020-02-04 PROCEDURE — 2709999900 HC NON-CHARGEABLE SUPPLY: Performed by: INTERNAL MEDICINE

## 2020-02-04 RX ORDER — MIDAZOLAM HYDROCHLORIDE 5 MG/ML
INJECTION INTRAMUSCULAR; INTRAVENOUS PRN
Status: DISCONTINUED | OUTPATIENT
Start: 2020-02-04 | End: 2020-02-04 | Stop reason: ALTCHOICE

## 2020-02-04 RX ORDER — SODIUM CHLORIDE, SODIUM LACTATE, POTASSIUM CHLORIDE, CALCIUM CHLORIDE 600; 310; 30; 20 MG/100ML; MG/100ML; MG/100ML; MG/100ML
INJECTION, SOLUTION INTRAVENOUS CONTINUOUS
Status: DISCONTINUED | OUTPATIENT
Start: 2020-02-04 | End: 2020-02-04 | Stop reason: HOSPADM

## 2020-02-04 RX ORDER — FENTANYL CITRATE 50 UG/ML
INJECTION, SOLUTION INTRAMUSCULAR; INTRAVENOUS PRN
Status: DISCONTINUED | OUTPATIENT
Start: 2020-02-04 | End: 2020-02-04 | Stop reason: ALTCHOICE

## 2020-02-04 RX ADMIN — SODIUM CHLORIDE, POTASSIUM CHLORIDE, SODIUM LACTATE AND CALCIUM CHLORIDE: 600; 310; 30; 20 INJECTION, SOLUTION INTRAVENOUS at 09:10

## 2020-02-04 ASSESSMENT — PAIN - FUNCTIONAL ASSESSMENT: PAIN_FUNCTIONAL_ASSESSMENT: 0-10

## 2020-02-04 NOTE — OP NOTE
[address] ,  [address] [facility], [facility]  Phone: [phone]   [facility]:[phone]    Colonoscopy Procedure Note    Patient: Stephani Wagner  : 1970    Procedure: Colonoscopy with --screening    Date:  2020     Endoscopist:  Georgie Alejandro MD    Referring Physician:  Tanya Solano DO    Preoperative Diagnosis:  SCREENING    Postoperative Diagnosis:  See impression    Anesthesia: Anesthesia: Moderate Sedation   Sedation: Versed 5 mg IV, fentanyl 100 mcg IV  Start Time: 9:12  Stop Time: 9:58  Withdrawal time:   ASA Class: 2  Mallampati: II (soft palate, uvula, fauces visible)    Indications: This is a 47y.o. year old male who presents today with screening for colon cancer. Procedure Details  Informed consent was obtained for the procedure, including sedation. Risks of perforation, hemorrhage, adverse drug reaction and aspiration were discussed. The patient was placed in the left lateral decubitus position. Based on the pre-procedure assessment, including review of the patient's medical history, medications, allergies, and review of systems, he had been deemed to be an appropriate candidate for conscious sedation; he was therefore sedated with the medications listed below. The patient was monitored continuously with ECG tracing, pulse oximetry, blood pressure monitoring, and direct observations. rectal examination was performed. The colonoscope was inserted into the rectum and advanced under direct vision to the cecum, which was identified by the ileocecal valve and appendiceal orifice. The quality of the colonic preparation was good. A careful inspection was made as the colonoscope was withdrawn, including a retroflexed view of the rectum; findings and interventions are described below. Appropriate photodocumentation was obtained. Patient tolerated the procedure well. Findings: - One 1 cm cecal polyp removed with hot snare.  Three ascending colon polyps around 8 mm to 1.5 cm in size removed with hot snare. Patient tends to ooze and bleed easily from sites and two clips were placed. Three transverse colon polyps ranging in size from 8 mm to 1.5 cm removed with hot snare (one of the sites was clipped). There is a 1.5 cm pedunculated polyp in the proximal rectum/rectosigmoid removed with hot snare, there is brisk oozing at site treated with 2 ml epinephrine injection and 2 resolution clips with hemostasis. - Anesthesia issues: no    Specimens: Was Obtained:  bottle A, sessile polyp, cecum, hot snare  Bottle B, multiple ascending colon polyps  Bottle C, multiple transverse colon polyps  Bottle D, rectosigmoid polyp      Complications:   None    Estimated blood loss: minimal    Disposition:   PACU - hemodynamically stable. Impression:   -One 1 cm cecal polyp removed with hot snare. Three ascending colon polyps around 8 mm to 1.5 cm in size removed with hot snare. Patient tends to ooze and bleed easily from sites and two clips were placed. Three transverse colon polyps ranging in size from 8 mm to 1.5 cm removed with hot snare (one of the sites was clipped). There is a 1.5 cm pedunculated polyp in the sigmoid removed with hot snare, there is brisk oozing at site treated with 2 ml epinephrine injection and 2 resolution clips with hemostasis. Recommendations:  -Await pathology. Repeat colonoscopy 3 years due to multiple large polyps.   - Avoid NSAIDs for 2 weeks        Edenilson Anthony 2/4/20 9:03 AM
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
## 2020-02-05 ENCOUNTER — TELEPHONE (OUTPATIENT)
Dept: FAMILY MEDICINE CLINIC | Age: 50
End: 2020-02-05

## 2020-03-03 RX ORDER — CLONAZEPAM 0.5 MG/1
TABLET ORAL
Qty: 180 TABLET | Refills: 0 | Status: SHIPPED | OUTPATIENT
Start: 2020-03-03 | End: 2020-06-04

## 2020-03-05 PROBLEM — Z12.11 SCREEN FOR COLON CANCER: Status: RESOLVED | Noted: 2020-02-04 | Resolved: 2020-03-05

## 2020-04-06 ENCOUNTER — TELEPHONE (OUTPATIENT)
Dept: FAMILY MEDICINE CLINIC | Age: 50
End: 2020-04-06

## 2020-04-22 ENCOUNTER — OFFICE VISIT (OUTPATIENT)
Dept: FAMILY MEDICINE CLINIC | Age: 50
End: 2020-04-22

## 2020-04-22 ENCOUNTER — TELEPHONE (OUTPATIENT)
Dept: FAMILY MEDICINE CLINIC | Age: 50
End: 2020-04-22

## 2020-06-10 ENCOUNTER — OFFICE VISIT (OUTPATIENT)
Dept: FAMILY MEDICINE CLINIC | Age: 50
End: 2020-06-10
Payer: COMMERCIAL

## 2020-06-10 VITALS
BODY MASS INDEX: 31.45 KG/M2 | WEIGHT: 213 LBS | DIASTOLIC BLOOD PRESSURE: 90 MMHG | HEART RATE: 85 BPM | TEMPERATURE: 98.3 F | OXYGEN SATURATION: 98 % | SYSTOLIC BLOOD PRESSURE: 120 MMHG

## 2020-06-10 PROCEDURE — 99213 OFFICE O/P EST LOW 20 MIN: CPT | Performed by: FAMILY MEDICINE

## 2020-06-10 ASSESSMENT — ENCOUNTER SYMPTOMS
SHORTNESS OF BREATH: 0
DIARRHEA: 0
COUGH: 1
NAUSEA: 0
VOMITING: 0

## 2020-06-11 ENCOUNTER — OFFICE VISIT (OUTPATIENT)
Dept: PRIMARY CARE CLINIC | Age: 50
End: 2020-06-11
Payer: COMMERCIAL

## 2020-06-11 VITALS — OXYGEN SATURATION: 98 % | HEART RATE: 72 BPM | TEMPERATURE: 98.8 F

## 2020-06-11 PROCEDURE — 99212 OFFICE O/P EST SF 10 MIN: CPT | Performed by: PHYSICIAN ASSISTANT

## 2020-06-11 NOTE — PROGRESS NOTES
exposure with self-quarantine   [x] Possible COVID-19 with isolation instructions and management of symptoms  [x] Follow-up with primary care physician or emergency department if worsens  [] Referred to emergency department for evaluation    [] Evaluation per physician/nurse practitioner in clinic  [] Prescription sent in  [] No Prescription sent in     Patient was seen personally services were performed described in the documentation as scribed by Julieta Diaz (Clinical support name)ANISA, in my presence and it is both accurate and complete. An  electronic signature was used to authenticate this note.      --Cora Madrigal MA on 6/11/2020 at 10:40 AM

## 2020-06-12 LAB
SARS-COV-2: NOT DETECTED
SOURCE: NORMAL

## 2020-06-24 ENCOUNTER — TELEPHONE (OUTPATIENT)
Dept: FAMILY MEDICINE CLINIC | Age: 50
End: 2020-06-24

## 2020-07-02 RX ORDER — CLONAZEPAM 0.5 MG/1
TABLET ORAL
Qty: 180 TABLET | Refills: 0 | Status: SHIPPED | OUTPATIENT
Start: 2020-07-02 | End: 2020-09-29

## 2020-07-02 NOTE — TELEPHONE ENCOUNTER
Refill Request     Last Seen: 6/10/2020    Last Written: #60  0rf  6/4/2020    Next Appointment:   Future Appointments   Date Time Provider Bart Quinteros   9/10/2020  4:30 PM DO VAISHNAVI Brown             Requested Prescriptions     Pending Prescriptions Disp Refills    clonazePAM (KLONOPIN) 0.5 MG tablet [Pharmacy Med Name: clonazePAM Oral Tablet 0.5 MG] 180 tablet 0     Sig: TAKE 1 TABLET BY MOUTH TWO TIMES A DAY AS NEEDED FOR ANXIETY

## 2020-09-10 ENCOUNTER — VIRTUAL VISIT (OUTPATIENT)
Dept: FAMILY MEDICINE CLINIC | Age: 50
End: 2020-09-10
Payer: COMMERCIAL

## 2020-09-10 PROCEDURE — 99441 PR PHYS/QHP TELEPHONE EVALUATION 5-10 MIN: CPT | Performed by: FAMILY MEDICINE

## 2020-09-11 ASSESSMENT — ENCOUNTER SYMPTOMS
SHORTNESS OF BREATH: 0
BLOOD IN STOOL: 0
COUGH: 0
ABDOMINAL PAIN: 0

## 2020-09-11 NOTE — PROGRESS NOTES
Subjective:      Patient ID: Jewels Powell is a 47 y.o. male. HPI  Pt in on tel visit-have permission-alone on call-at home-I am on office. 3 mo check for Klonipin-doing very well-no ill effects. Prior to Visit Medications :  Medication clonazePAM (KLONOPIN) 0.5 MG tablet, Sig TAKE 1 TABLET BY MOUTH TWO TIMES A DAY AS NEEDED FOR ANXIETY, Taking? Yes, Authorizing Provider David Stevens, DO    Medication atorvastatin (LIPITOR) 10 MG tablet, Sig TAKE 1 TABLET BY MOUTH ONE TIME A DAY , Taking? Yes, Authorizing Provider David Stevens, DO    Medication sertraline (ZOLOFT) 50 MG tablet, Sig TAKE 1 TABLET BY MOUTH IN THE MORNING, Taking? Yes, Authorizing Provider Arleth Stevens, DO    Medication therapeutic multivitamin-minerals (THERAGRAN-M) tablet, Sig Take 1 tablet by mouth daily. Indications: OTC, Taking? Yes, Authorizing Provider Historical Provider, MD      Past Medical History:  No date: Anxiety  No date: Chronic low back pain      Comment:  level 5  4/9/2015: Degenerative disc disease, lumbar  No date: Hyperlipidemia  No date: Hypertension  4/3/2019: Moderate episode of recurrent major depressive disorder   (HCC)  No date: Sleep apnea      Comment:  uses c-pap        Review of Systems    Review of Systems   Constitutional: Negative for fever. Respiratory: Negative for cough and shortness of breath. Cardiovascular: Negative for chest pain and palpitations. Gastrointestinal: Negative for abdominal pain and blood in stool. Genitourinary: Negative for frequency and hematuria. Musculoskeletal: Positive for arthralgias. Neurological: Negative for headaches. Psychiatric/Behavioral: Negative for sleep disturbance. The patient is nervous/anxious. Objective:   Physical Exam      Physical Exam  Neurological:      Mental Status: He is alert and oriented to person, place, and time. Psychiatric:         Mood and Affect: Mood normal.         Behavior: Behavior normal.         Thought Content:  Thought content normal.         Judgment: Judgment normal.         Assessment:       Diagnosis Orders   1. Anxiety     2. Diastolic hypertension  CBC Auto Differential    Comprehensive Metabolic Panel    Lipid Panel    CBC Auto Differential    Comprehensive Metabolic Panel    Lipid Panel   3. Special screening for malignant neoplasm of prostate  Psa screening    Psa screening         Plan:      Ivonne Benedict was seen today for anxiety. Diagnoses and all orders for this visit:    Anxiety  Continue meds as needed-reduce when possible. Keep active. Diastolic hypertension  -     CBC Auto Differential  -     Comprehensive Metabolic Panel  -     Lipid Panel  -     CBC Auto Differential; Future  -     Comprehensive Metabolic Panel; Future  -     Lipid Panel; Future  Continue meds-monitor BP-all me 1 mo. Special screening for malignant neoplasm of prostate  -     Psa screening  -     Psa screening;  Future            David Stevens, DO

## 2020-09-11 NOTE — PATIENT INSTRUCTIONS
Anxiety  Continue meds as needed-reduce when possible. Keep active. Diastolic hypertension  -     CBC Auto Differential  -     Comprehensive Metabolic Panel  -     Lipid Panel  -     CBC Auto Differential; Future  -     Comprehensive Metabolic Panel; Future  -     Lipid Panel; Future  Continue meds-monitor BP-all me 1 mo. Special screening for malignant neoplasm of prostate  -     Psa screening  -     Psa screening;  Future

## 2020-10-01 ENCOUNTER — HOSPITAL ENCOUNTER (OUTPATIENT)
Age: 50
Discharge: HOME | End: 2020-10-01
Payer: COMMERCIAL

## 2020-10-01 LAB
A/G RATIO: 1.7 (ref 1.1–2.2)
ALBUMIN SERPL-MCNC: 4.5 G/DL (ref 3.4–5)
ALP BLD-CCNC: 47 U/L (ref 40–129)
ALT SERPL-CCNC: 36 U/L (ref 10–40)
ANION GAP SERPL CALCULATED.3IONS-SCNC: 9 MMOL/L (ref 3–16)
AST SERPL-CCNC: 24 U/L (ref 15–37)
BASOPHILS ABSOLUTE: 0 K/UL (ref 0–0.2)
BASOPHILS RELATIVE PERCENT: 0.8 %
BILIRUB SERPL-MCNC: 0.4 MG/DL (ref 0–1)
BUN BLDV-MCNC: 14 MG/DL (ref 7–20)
CALCIUM SERPL-MCNC: 9.7 MG/DL (ref 8.3–10.6)
CHLORIDE BLD-SCNC: 103 MMOL/L (ref 99–110)
CHOLESTEROL, TOTAL: 190 MG/DL (ref 0–199)
CO2: 24 MMOL/L (ref 21–32)
CREAT SERPL-MCNC: 0.9 MG/DL (ref 0.9–1.3)
EOSINOPHILS ABSOLUTE: 0.1 K/UL (ref 0–0.6)
EOSINOPHILS RELATIVE PERCENT: 2.1 %
GFR AFRICAN AMERICAN: >60
GFR NON-AFRICAN AMERICAN: >60
GLOBULIN: 2.6 G/DL
GLUCOSE BLD-MCNC: 102 MG/DL (ref 70–99)
HCT VFR BLD CALC: 41.7 % (ref 40.5–52.5)
HDLC SERPL-MCNC: 63 MG/DL (ref 40–60)
HEMOGLOBIN: 14 G/DL (ref 13.5–17.5)
LDL CHOLESTEROL CALCULATED: 109 MG/DL
LYMPHOCYTES ABSOLUTE: 1.8 K/UL (ref 1–5.1)
LYMPHOCYTES RELATIVE PERCENT: 34 %
MCH RBC QN AUTO: 28.8 PG (ref 26–34)
MCHC RBC AUTO-ENTMCNC: 33.5 G/DL (ref 31–36)
MCV RBC AUTO: 85.8 FL (ref 80–100)
MONOCYTES ABSOLUTE: 0.4 K/UL (ref 0–1.3)
MONOCYTES RELATIVE PERCENT: 8.1 %
NEUTROPHILS ABSOLUTE: 2.9 K/UL (ref 1.7–7.7)
NEUTROPHILS RELATIVE PERCENT: 55 %
PDW BLD-RTO: 13.4 % (ref 12.4–15.4)
PLATELET # BLD: 209 K/UL (ref 135–450)
PMV BLD AUTO: 9.9 FL (ref 5–10.5)
POTASSIUM SERPL-SCNC: 4.6 MMOL/L (ref 3.5–5.1)
PROSTATE SPECIFIC ANTIGEN: 1.47 NG/ML (ref 0–4)
RBC # BLD: 4.86 M/UL (ref 4.2–5.9)
SODIUM BLD-SCNC: 136 MMOL/L (ref 136–145)
TOTAL PROTEIN: 7.1 G/DL (ref 6.4–8.2)
TRIGL SERPL-MCNC: 92 MG/DL (ref 0–150)
VLDLC SERPL CALC-MCNC: 18 MG/DL
WBC # BLD: 5.2 K/UL (ref 4–11)

## 2020-10-01 PROCEDURE — 85025 COMPLETE CBC W/AUTO DIFF WBC: CPT

## 2020-10-01 PROCEDURE — 36415 COLL VENOUS BLD VENIPUNCTURE: CPT

## 2020-10-01 PROCEDURE — 80061 LIPID PANEL: CPT

## 2020-10-01 PROCEDURE — 84153 ASSAY OF PSA TOTAL: CPT

## 2020-10-01 PROCEDURE — 80053 COMPREHEN METABOLIC PANEL: CPT

## 2020-11-23 ENCOUNTER — OFFICE VISIT (OUTPATIENT)
Dept: PRIMARY CARE CLINIC | Age: 50
End: 2020-11-23
Payer: COMMERCIAL

## 2020-11-23 PROCEDURE — 99211 OFF/OP EST MAY X REQ PHY/QHP: CPT | Performed by: NURSE PRACTITIONER

## 2020-11-23 NOTE — PATIENT INSTRUCTIONS

## 2020-11-23 NOTE — PROGRESS NOTES
Nba Mews received a viral test for COVID-19. They were educated on isolation and quarantine as appropriate. For any symptoms, they were directed to seek care from their PCP, given contact information to establish with a doctor, directed to an urgent care or the emergency room.

## 2020-11-24 LAB — SARS-COV-2, NAA: NOT DETECTED

## 2020-12-02 ENCOUNTER — OFFICE VISIT (OUTPATIENT)
Dept: FAMILY MEDICINE CLINIC | Age: 50
End: 2020-12-02
Payer: COMMERCIAL

## 2020-12-02 VITALS
SYSTOLIC BLOOD PRESSURE: 125 MMHG | BODY MASS INDEX: 32.47 KG/M2 | DIASTOLIC BLOOD PRESSURE: 80 MMHG | OXYGEN SATURATION: 99 % | TEMPERATURE: 97.7 F | WEIGHT: 219.2 LBS | HEART RATE: 74 BPM | HEIGHT: 69 IN

## 2020-12-02 PROCEDURE — 99214 OFFICE O/P EST MOD 30 MIN: CPT | Performed by: FAMILY MEDICINE

## 2020-12-02 PROCEDURE — 90471 IMMUNIZATION ADMIN: CPT | Performed by: FAMILY MEDICINE

## 2020-12-02 PROCEDURE — 90686 IIV4 VACC NO PRSV 0.5 ML IM: CPT | Performed by: FAMILY MEDICINE

## 2020-12-02 ASSESSMENT — ENCOUNTER SYMPTOMS
CONSTIPATION: 0
BLOOD IN STOOL: 0
CHEST TIGHTNESS: 0
SHORTNESS OF BREATH: 0
ABDOMINAL PAIN: 0
COUGH: 0

## 2020-12-02 NOTE — PROGRESS NOTES
Vaccine Information Sheet, \"Influenza - Inactivated\"  given to Yumiko Quiroz, or parent/legal guardian of  Yumiko Quiroz and verbalized understanding. Patient responses:    Have you ever had a reaction to a flu vaccine? No  Do you have any current illness? No  Have you ever had Guillian Valier Syndrome? No  Do you have a serious allergy to any of the follow: Neomycin, Polymyxin, Thimerosal, eggs or egg products? No    Flu vaccine given per order. Please see immunization tab. Risks and benefits explained. Current VIS given.       Immunizations Administered     Name Date Dose Route    Influenza, Quadv, IM, PF (6 mo and older Fluzone, Flulaval, Fluarix, and 3 yrs and older Afluria) 12/2/2020 0.5 mL Intramuscular    Site: Deltoid- Left    Lot: [id number]    NDC: 45389-353-56

## 2020-12-02 NOTE — PATIENT INSTRUCTIONS
Anxiety  Continue medications and reduce when possible and stay as active as possible. Diastolic hypertension  Continue medications and no added salt diet. Work on some slow weight loss by reducing carbs and increasing activity as tolerated. Mixed hyperlipidemia  Continue medications and work on weight loss as above.   Other orders  -     INFLUENZA, QUADV, 3 YRS AND OLDER, IM PF, PREFILL SYR OR SDV, 0.5ML (AFLURIA QUADV, PF)    Video visit 3 months office visit 6 months

## 2020-12-02 NOTE — PROGRESS NOTES
Subjective:      Patient ID: Jazmine Burton is a 47 y.o. male. HPI  Patient in for checkup for 3 or 4 medical issues. Anxiety-on medication and doing well without any ill effects. Diastolic hypertension-seems to always run about 85 on diastolic at home. Hyperlipidemia-on medication doing well with no ill effects. Denies any other issues to discuss. Colonoscopy earlier this year due back in about 3 years. Recent lab work all within normal limits. Prior to Visit Medications :  Medication clonazePAM (KLONOPIN) 0.5 MG tablet, Sig TAKE 1 TABLET BY MOUTH TWO TIMES A DAY AS NEEDED FOR ANXIETY, Taking? Yes, Authorizing Provider David Stevens, DO    Medication sertraline (ZOLOFT) 50 MG tablet, Sig TAKE 1 TABLET BY MOUTH IN THE MORNING , Taking? Yes, Authorizing Provider David Stevens, DO    Medication atorvastatin (LIPITOR) 10 MG tablet, Sig TAKE 1 TABLET BY MOUTH ONE TIME A DAY , Taking? Yes, Authorizing Provider Josiah Stevens, DO    Medication therapeutic multivitamin-minerals (THERAGRAN-M) tablet, Sig Take 1 tablet by mouth daily. Indications: OTC, Taking? Yes, Authorizing Provider Historical Provider, MD      Past Medical History:  No date: Anxiety  No date: Chronic low back pain      Comment:  level 5  4/9/2015: Degenerative disc disease, lumbar  No date: Hyperlipidemia  No date: Hypertension  4/3/2019: Moderate episode of recurrent major depressive disorder   (HCC)  No date: Sleep apnea      Comment:  uses c-pap        Review of Systems    Review of Systems   Constitutional: Negative for fever and unexpected weight change. HENT: Negative for congestion and postnasal drip. Eyes: Negative for visual disturbance. Respiratory: Negative for cough, chest tightness and shortness of breath. Cardiovascular: Negative for chest pain, palpitations and leg swelling. Gastrointestinal: Negative for abdominal pain, blood in stool and constipation. Genitourinary: Negative for dysuria, frequency and hematuria. Musculoskeletal: Negative for arthralgias and myalgias. Skin: Negative for rash. Neurological: Negative for tremors and headaches. Psychiatric/Behavioral: Positive for sleep disturbance. The patient is nervous/anxious. Objective:   Physical Exam      Physical Exam  Constitutional:       Appearance: Normal appearance. He is well-developed. HENT:      Head: Normocephalic. Mouth/Throat:      Mouth: Mucous membranes are moist.      Pharynx: Oropharynx is clear. Eyes:      Conjunctiva/sclera: Conjunctivae normal.   Neck:      Musculoskeletal: Neck supple. Thyroid: No thyromegaly. Vascular: No carotid bruit. Cardiovascular:      Rate and Rhythm: Normal rate and regular rhythm. Heart sounds: Normal heart sounds. Pulmonary:      Effort: Pulmonary effort is normal.      Breath sounds: Normal breath sounds. Abdominal:      General: There is no distension. Palpations: Abdomen is soft. There is no mass. Tenderness: There is no abdominal tenderness. Musculoskeletal: Normal range of motion. Right lower leg: No edema. Left lower leg: No edema. Lymphadenopathy:      Cervical: No cervical adenopathy. Skin:     General: Skin is warm and dry. Neurological:      Mental Status: He is alert and oriented to person, place, and time. Psychiatric:         Mood and Affect: Mood normal.         Behavior: Behavior normal.         Thought Content: Thought content normal.         Judgment: Judgment normal.         Assessment:       Diagnosis Orders   1. Anxiety     2. Diastolic hypertension     3. Mixed hyperlipidemia           Plan:      Kimber Guerra was seen today for 3 month follow-up. Diagnoses and all orders for this visit:    Anxiety  Continue medications and reduce when possible and stay as active as possible. Diastolic hypertension  Continue medications and no added salt diet.   Work on some slow weight loss by reducing carbs and increasing activity as tolerated. Mixed hyperlipidemia  Continue medications and work on weight loss as above.   Other orders  -     INFLUENZA, QUADV, 3 YRS AND OLDER, IM PF, PREFILL SYR OR SDV, 0.5ML (AFLURIA QUADV, PF)    Video visit 3 months office visit 6 months          David Stevens, DO

## 2020-12-28 RX ORDER — CLONAZEPAM 0.5 MG/1
TABLET ORAL
Qty: 180 TABLET | Refills: 0 | Status: SHIPPED | OUTPATIENT
Start: 2020-12-28 | End: 2021-04-06

## 2020-12-28 NOTE — TELEPHONE ENCOUNTER
Refill Request     Last Seen: 12/2/2020    Last Written: #180   0rf  9/29/2020    Next Appointment:   Future Appointments   Date Time Provider Bart Quinteros   3/2/2021  4:30 PM DO VAISHNAVI Brown   6/14/2021  3:30 PM DO VAISHNAVI Brown [facility]       Appointment scheduled      Requested Prescriptions     Pending Prescriptions Disp Refills    clonazePAM (KLONOPIN) 0.5 MG tablet [Pharmacy Med Name: clonazePAM Oral Tablet 0.5 MG] 180 tablet 0     Sig: TAKE 1 TABLET BY MOUTH TWO TIMES A DAY AS NEEDED FOR ANXIETY

## 2020-12-29 ENCOUNTER — OFFICE VISIT (OUTPATIENT)
Dept: PRIMARY CARE CLINIC | Age: 50
End: 2020-12-29
Payer: COMMERCIAL

## 2020-12-29 PROCEDURE — 99211 OFF/OP EST MAY X REQ PHY/QHP: CPT | Performed by: NURSE PRACTITIONER

## 2020-12-29 NOTE — PROGRESS NOTES
Gume Mitchell received a viral test for COVID-19. They were educated on isolation and quarantine as appropriate. For any symptoms, they were directed to seek care from their PCP, given contact information to establish with a doctor, directed to an urgent care or the emergency room.

## 2020-12-29 NOTE — PATIENT INSTRUCTIONS

## 2020-12-30 LAB — SARS-COV-2, NAA: NOT DETECTED

## 2021-01-25 ENCOUNTER — OFFICE VISIT (OUTPATIENT)
Dept: ORTHOPEDIC SURGERY | Age: 51
End: 2021-01-25
Payer: COMMERCIAL

## 2021-01-25 VITALS — HEIGHT: 69 IN | WEIGHT: 219 LBS | BODY MASS INDEX: 32.44 KG/M2

## 2021-01-25 DIAGNOSIS — M54.42 ACUTE LEFT-SIDED LOW BACK PAIN WITH LEFT-SIDED SCIATICA: Primary | ICD-10-CM

## 2021-01-25 PROCEDURE — 96372 THER/PROPH/DIAG INJ SC/IM: CPT | Performed by: PHYSICAL MEDICINE & REHABILITATION

## 2021-01-25 PROCEDURE — 99203 OFFICE O/P NEW LOW 30 MIN: CPT | Performed by: PHYSICAL MEDICINE & REHABILITATION

## 2021-01-25 RX ORDER — KETOROLAC TROMETHAMINE 30 MG/ML
60 INJECTION, SOLUTION INTRAMUSCULAR; INTRAVENOUS ONCE
Status: COMPLETED | OUTPATIENT
Start: 2021-01-25 | End: 2021-01-25

## 2021-01-25 RX ORDER — PREDNISONE 10 MG/1
TABLET ORAL
Qty: 26 TABLET | Refills: 0 | Status: SHIPPED | OUTPATIENT
Start: 2021-01-25 | End: 2021-03-02

## 2021-01-25 RX ADMIN — KETOROLAC TROMETHAMINE 60 MG: 30 INJECTION, SOLUTION INTRAMUSCULAR; INTRAVENOUS at 16:04
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
## 2021-03-02 ENCOUNTER — TELEMEDICINE (OUTPATIENT)
Dept: FAMILY MEDICINE CLINIC | Age: 51
End: 2021-03-02
Payer: COMMERCIAL

## 2021-03-02 DIAGNOSIS — I10 DIASTOLIC HYPERTENSION: ICD-10-CM

## 2021-03-02 DIAGNOSIS — F41.9 ANXIETY: Primary | ICD-10-CM

## 2021-03-02 PROCEDURE — 99212 OFFICE O/P EST SF 10 MIN: CPT | Performed by: FAMILY MEDICINE

## 2021-03-02 ASSESSMENT — ENCOUNTER SYMPTOMS
BACK PAIN: 1
COUGH: 0
SHORTNESS OF BREATH: 0
CHEST TIGHTNESS: 0
ABDOMINAL PAIN: 0
BLOOD IN STOOL: 0
CONSTIPATION: 0

## 2021-03-02 NOTE — PROGRESS NOTES
Subjective:      Patient ID: Jocelin Olmedo is a 45 y.o. male. HPI  This is a video visit with the patient due to the ongoing virus in the community. He has at [facility] is alone on the callwe have permission for the call and I am in my office at [facility]. Anxiety/insomniaKlonopin working Daily [employer] it easier for him to wind down in the evening roughly about 7:00 and does not go to bed till 10 or 11no ill effects from the medication. Diastolic hypertensionhe is diastolic pressure runs between 80 and 85 most of the timehe used to be on medication and now is not and was trying to lower that on his own which he is doing fairly well with that. Colonoscopynot due for a couple years. Lab work last October was all within normal limits. He denies any other issues to discuss and has an appointment for mid June. Prior to Visit Medications :  Medication sertraline (ZOLOFT) 50 MG tablet, Sig TAKE 1 TABLET BY MOUTH IN THE MORNING , Taking? Yes, Authorizing Provider David Stevens, DO    Medication clonazePAM (KLONOPIN) 0.5 MG tablet, Sig TAKE 1 TABLET BY MOUTH TWO TIMES A DAY AS NEEDED FOR ANXIETY, Taking? Yes, Authorizing Provider David Stevens, DO    Medication atorvastatin (LIPITOR) 10 MG tablet, Sig TAKE 1 TABLET BY MOUTH ONE TIME A DAY , Taking? Yes, Authorizing Provider Silver Stevens, DO    Medication therapeutic multivitamin-minerals (THERAGRAN-M) tablet, Sig Take 1 tablet by mouth daily. Indications: OTC, Taking? Yes, Authorizing Provider Historical Provider, MD      Past Medical History:  No date: Anxiety  No date: Chronic low back pain      Comment:  level 5  4/9/2015: Degenerative disc disease, lumbar  No date: Hyperlipidemia  No date: Hypertension  4/3/2019: Moderate episode of recurrent major depressive disorder   (HCC)  No date: Sleep apnea      Comment:  uses c-pap        Review of Systems    Review of Systems   Constitutional: Negative for fever and unexpected weight change. HENT: Negative for congestion and postnasal drip. Eyes: Negative for visual disturbance. Respiratory: Negative for cough, chest tightness and shortness of breath. Cardiovascular: Negative for chest pain, palpitations and leg swelling. Gastrointestinal: Negative for abdominal pain, blood in stool and constipation. Genitourinary: Negative for dysuria, frequency and hematuria. Musculoskeletal: Positive for arthralgias and back pain. Negative for myalgias. Skin: Negative for rash. Neurological: Negative for tremors and headaches. Psychiatric/Behavioral: Positive for sleep disturbance. The patient is nervous/anxious. Objective:   Physical Exam      Physical Exam  Constitutional:       Appearance: Normal appearance. Neurological:      Mental Status: He is alert and oriented to person, place, and time. Psychiatric:         Mood and Affect: Mood normal.         Behavior: Behavior normal.         Thought Content: Thought content normal.         Judgment: Judgment normal.         Assessment:       Diagnosis Orders   1. Anxiety     2. Diastolic hypertension           Plan:      Bee Johnson was seen today for 3 month follow-up. Diagnoses and all orders for this visit:    Anxiety  Continue medication as needed try to reduce when possiblekeep active during the day. Diastolic hypertension  Keep a close eye on the blood pressure and should be taking it at least once a month if not twice and I need to know if diastolic is consistently over 80. Keep weight down and no added salt dietlimit caffeine and preferably no alcohol. This was approximately a 12 to 15-minute video visit. Prior to and during the visit chart was checked for labs colonoscopy and immunizations.      David Stevens DO

## 2021-03-02 NOTE — PATIENT INSTRUCTIONS
Anxiety  Continue medication as needed try to reduce when possiblekeep active during the day. Diastolic hypertension  Keep a close eye on the blood pressure and should be taking it at least once a month if not twice and I need to know if diastolic is consistently over 80. Keep weight down and no added salt dietlimit caffeine and preferably no alcohol. This was approximately a 12 to 15-minute video visit. Prior to and during the visit chart was checked for labs colonoscopy and immunizations.      David Stevens, DO

## 2021-04-02 DIAGNOSIS — F41.9 ANXIETY: ICD-10-CM

## 2021-04-06 RX ORDER — CLONAZEPAM 0.5 MG/1
TABLET ORAL
Qty: 180 TABLET | Refills: 0 | Status: SHIPPED | OUTPATIENT
Start: 2021-04-06 | End: 2021-07-02

## 2021-06-14 ENCOUNTER — OFFICE VISIT (OUTPATIENT)
Dept: FAMILY MEDICINE CLINIC | Age: 51
End: 2021-06-14
Payer: COMMERCIAL

## 2021-06-14 VITALS
BODY MASS INDEX: 30.57 KG/M2 | OXYGEN SATURATION: 97 % | SYSTOLIC BLOOD PRESSURE: 110 MMHG | HEIGHT: 69 IN | WEIGHT: 206.4 LBS | HEART RATE: 99 BPM | DIASTOLIC BLOOD PRESSURE: 70 MMHG

## 2021-06-14 DIAGNOSIS — F41.9 ANXIETY: Primary | ICD-10-CM

## 2021-06-14 DIAGNOSIS — E78.2 MIXED HYPERLIPIDEMIA: ICD-10-CM

## 2021-06-14 DIAGNOSIS — F33.1 MODERATE EPISODE OF RECURRENT MAJOR DEPRESSIVE DISORDER (HCC): ICD-10-CM

## 2021-06-14 PROCEDURE — 99213 OFFICE O/P EST LOW 20 MIN: CPT | Performed by: FAMILY MEDICINE

## 2021-06-14 ASSESSMENT — ENCOUNTER SYMPTOMS
ABDOMINAL PAIN: 0
CONSTIPATION: 0
COUGH: 0
BLOOD IN STOOL: 0
CHEST TIGHTNESS: 0
SHORTNESS OF BREATH: 0

## 2021-06-14 NOTE — PROGRESS NOTES
Subjective:      Patient ID: Vinh Moore is a 45 y.o. male. HPI  Patient in for checkup on several medical issues. Anxietyhe uses his Klonopin as needed which is usually every day without any ill effects. Hyperlipidemiaon medication and last lab check was within normal limits. Depressionon Zoloft and doing very well without any ill effects. Blood pressure was up considerably today and he states that he has been treated in the past but he lost 30 or 40 pounds and we discontinued his medication and its been okay until todayhe states he did run up the stairs to get to the office and his home blood pressures are no higher than 120/70. He denies any other issues to discuss. Prior to Visit Medications :  Medication clonazePAM (KLONOPIN) 0.5 MG tablet, Sig TAKE 1 TABLET BY MOUTH TWO TIMES A DAY AS NEEDED FOR ANXIETY, Taking? Yes, Authorizing Provider David Stevens, DO    Medication sertraline (ZOLOFT) 50 MG tablet, Sig TAKE 1 TABLET BY MOUTH IN THE MORNING , Taking? Yes, Authorizing Provider David Stevens, DO    Medication atorvastatin (LIPITOR) 10 MG tablet, Sig TAKE 1 TABLET BY MOUTH ONE TIME A DAY , Taking? Yes, Authorizing Provider Zayda Stevens, DO    Medication therapeutic multivitamin-minerals (THERAGRAN-M) tablet, Sig Take 1 tablet by mouth daily. Indications: OTC, Taking? Yes, Authorizing Provider Historical Provider, MD      Past Medical History:  No date: Anxiety  No date: Chronic low back pain      Comment:  level 5  4/9/2015: Degenerative disc disease, lumbar  No date: Hyperlipidemia  No date: Hypertension  4/3/2019: Moderate episode of recurrent major depressive disorder   (HCC)  No date: Sleep apnea      Comment:  uses c-pap        Review of Systems    Review of Systems   Constitutional: Negative for unexpected weight change. Has dropped 6 or 8 pounds since last visit. HENT: Negative for congestion and postnasal drip. Eyes: Negative for visual disturbance.    Respiratory: Negative for cough, chest tightness and shortness of breath. Cardiovascular: Negative for chest pain, palpitations and leg swelling. Gastrointestinal: Negative for abdominal pain, blood in stool and constipation. Genitourinary: Negative for dysuria, frequency and hematuria. Musculoskeletal: Negative for arthralgias and myalgias. Skin: Negative for rash. Neurological: Negative for tremors and headaches. Psychiatric/Behavioral: Negative for sleep disturbance. The patient is nervous/anxious. Objective:   Physical Exam      Physical Exam  Constitutional:       Appearance: He is well-developed. HENT:      Head: Normocephalic. Eyes:      Conjunctiva/sclera: Conjunctivae normal.   Neck:      Thyroid: No thyromegaly. Vascular: No carotid bruit. Cardiovascular:      Rate and Rhythm: Normal rate and regular rhythm. Heart sounds: Normal heart sounds. Pulmonary:      Effort: Pulmonary effort is normal.      Breath sounds: Normal breath sounds. Abdominal:      General: There is no distension. Palpations: Abdomen is soft. There is no mass. Tenderness: There is no abdominal tenderness. Musculoskeletal:         General: Normal range of motion. Cervical back: Neck supple. Lymphadenopathy:      Cervical: No cervical adenopathy. Skin:     General: Skin is warm and dry. Neurological:      Mental Status: He is alert and oriented to person, place, and time. Psychiatric:         Behavior: Behavior normal.         Thought Content: Thought content normal.         Judgment: Judgment normal.         Assessment:       Diagnosis Orders   1. Anxiety     2. Mixed hyperlipidemia     3. Moderate episode of recurrent major depressive disorder [facility])           Plan:      Tomi Hurtado was seen today for 3 month follow-up. Diagnoses and all orders for this visit:    Anxiety  Continue medication as needed and try and reduce when possible. Notify me of any increase in anxiety symptoms.   Mixed hyperlipidemia  Continue medications and we will be rechecking labs probably next visit  Moderate episode of recurrent major depressive disorder ([facility] [address]  Continue medications and notify me if any increase in depression. This is been approximately a 20-minute visit with the patient.   David Stevens, DO

## 2021-06-14 NOTE — PATIENT INSTRUCTIONS
Anxiety  Continue medication as needed and try and reduce when possible. Notify me of any increase in anxiety symptoms. Mixed hyperlipidemia  Continue medications and we will be rechecking labs probably next visit  Moderate episode of recurrent major depressive disorder ([facility] [address]  Continue medications and notify me if any increase in depression.         This is been approximately a 20-minute visit

## 2021-07-01 DIAGNOSIS — F41.9 ANXIETY: ICD-10-CM

## 2021-07-01 NOTE — TELEPHONE ENCOUNTER
Refill Request - Controlled Substance    Last Seen Department: 6/14/2021    Last Seen by PCP: 6/14/2021    Last Written: 4/6/21 180 tablet 0 refills    Last UDS: 5/23/18     Med Agreement Signed On: n/a    Next Appointment: 9/14/2021    Future appointment scheduled    Requested Prescriptions     Pending Prescriptions Disp Refills    clonazePAM (KLONOPIN) 0.5 MG tablet [Pharmacy Med Name: clonazePAM Oral Tablet 0.5 MG] 180 tablet 0     Sig: TAKE 1 TABLET BY MOUTH TWO TIMES A DAY AS NEEDED FOR ANXIETY

## 2021-07-02 RX ORDER — CLONAZEPAM 0.5 MG/1
TABLET ORAL
Qty: 180 TABLET | Refills: 0 | Status: SHIPPED | OUTPATIENT
Start: 2021-07-02 | End: 2021-09-14 | Stop reason: SDUPTHER

## 2021-09-14 ENCOUNTER — TELEMEDICINE (OUTPATIENT)
Dept: FAMILY MEDICINE CLINIC | Age: 51
End: 2021-09-14
Payer: COMMERCIAL

## 2021-09-14 DIAGNOSIS — F41.9 ANXIETY: ICD-10-CM

## 2021-09-14 DIAGNOSIS — F41.1 GAD (GENERALIZED ANXIETY DISORDER): Primary | ICD-10-CM

## 2021-09-14 PROCEDURE — 99213 OFFICE O/P EST LOW 20 MIN: CPT | Performed by: STUDENT IN AN ORGANIZED HEALTH CARE EDUCATION/TRAINING PROGRAM

## 2021-09-14 RX ORDER — CLONAZEPAM 0.5 MG/1
0.5 TABLET ORAL 2 TIMES DAILY PRN
Qty: 180 TABLET | Refills: 0 | Status: SHIPPED | OUTPATIENT
Start: 2021-10-03 | End: 2022-01-03

## 2021-09-14 SDOH — ECONOMIC STABILITY: FOOD INSECURITY: WITHIN THE PAST 12 MONTHS, THE FOOD YOU BOUGHT JUST DIDN'T LAST AND YOU DIDN'T HAVE MONEY TO GET MORE.: NEVER TRUE

## 2021-09-14 SDOH — ECONOMIC STABILITY: FOOD INSECURITY: WITHIN THE PAST 12 MONTHS, YOU WORRIED THAT YOUR FOOD WOULD RUN OUT BEFORE YOU GOT MONEY TO BUY MORE.: NEVER TRUE

## 2021-09-14 ASSESSMENT — SOCIAL DETERMINANTS OF HEALTH (SDOH): HOW HARD IS IT FOR YOU TO PAY FOR THE VERY BASICS LIKE FOOD, HOUSING, MEDICAL CARE, AND HEATING?: NOT HARD AT ALL

## 2021-09-14 NOTE — PROGRESS NOTES
Kaaren Hodgkin (:  1970) is a 45 y.o. male,Established patient, here for evaluation of the following chief complaint(s): Anxiety (3 month f/u )         ASSESSMENT/PLAN:  1. KATHI (generalized anxiety disorder)  2. Anxiety  -     clonazePAM (KLONOPIN) 0.5 MG tablet; Take 1 tablet by mouth 2 times daily as needed for Anxiety for up to 90 days. , Disp-180 tablet, R-0Normal  Patient doing well on medication without red flag signs or symptoms. Will continue current medication. No follow-ups on file. SUBJECTIVE/OBJECTIVE:  HPI    Anxiety  Feels well controlled on current medication.  Zoloft and klonopin 0.5mg BID  No Ae of medications    Review of Systems    Patient-Reported Vitals 2021   Patient-Reported Weight 206lb   Patient-Reported Height -   Patient-Reported Systolic -   Patient-Reported Diastolic -   Patient-Reported Pulse -   Patient-Reported Temperature -        Physical Exam    [INSTRUCTIONS:  \"[x]\" Indicates a positive item  \"[]\" Indicates a negative item  -- DELETE ALL ITEMS NOT EXAMINED]    Constitutional: [x] Appears well-developed and well-nourished [x] No apparent distress      [] Abnormal -     Mental status: [x] Alert and awake  [x] Oriented to person/place/time [x] Able to follow commands    [] Abnormal -     Eyes:   EOM    [x]  Normal    [] Abnormal -   Sclera  [x]  Normal    [] Abnormal -          Discharge [x]  None visible   [] Abnormal -     HENT: [x] Normocephalic, atraumatic  [] Abnormal -   [x] Mouth/Throat: Mucous membranes are moist    External Ears [x] Normal  [] Abnormal -    Neck: [x] No visualized mass [] Abnormal -     Pulmonary/Chest: [x] Respiratory effort normal   [x] No visualized signs of difficulty breathing or respiratory distress        [] Abnormal -      Musculoskeletal:   [x] Normal gait with no signs of ataxia         [x] Normal range of motion of neck        [] Abnormal -     Neurological:        [x] No Facial Asymmetry (Cranial nerve 7 motor function)

## 2021-10-04 RX ORDER — ATORVASTATIN CALCIUM 20 MG/1
20 TABLET, FILM COATED ORAL DAILY
Qty: 90 TABLET | Refills: 1 | Status: SHIPPED | OUTPATIENT
Start: 2021-10-04 | End: 2022-06-27

## 2021-12-28 NOTE — TELEPHONE ENCOUNTER
Refill Request     Last Seen: Last Seen Department: 9/14/2021  Last Seen by PCP: 6/14/2021    Last Written: 6/28/2021 #90 x 1    Next Appointment:   Future Appointments   Date Time Provider Bart Quinteros   2/16/2022  2:45 PM DO VAISHNAVI Powers  [place] [facility]       Future appointment scheduled      Requested Prescriptions     Pending Prescriptions Disp Refills    sertraline (ZOLOFT) 50 MG tablet [Pharmacy Med Name: Sertraline HCl Oral Tablet 50 MG] 90 tablet 0     Sig: TAKE 1 TABLET BY MOUTH IN THE MORNING

## 2021-12-30 DIAGNOSIS — F41.9 ANXIETY: ICD-10-CM

## 2021-12-30 NOTE — TELEPHONE ENCOUNTER
Refill Request     Last Seen: Last Seen Department: 9/14/2021  Last Seen by PCP: 9/14/2021    Last Written: 10/3/2021    Next Appointment:   Future Appointments   Date Time Provider Bart Quinteros   2/16/2022  2:45 PM DO VAISHNAVI Toney  [place] [facility]       Future appointment scheduled      Requested Prescriptions     Pending Prescriptions Disp Refills    clonazePAM (KLONOPIN) 0.5 MG tablet [Pharmacy Med Name: clonazePAM Oral Tablet 0.5 MG] 180 tablet 0     Sig: TAKE 1 TABLET BY MOUTH TWO TIMES A DAY AS NEEDED FOR ANXIETY FOR UP TO 90 DAYS

## 2022-01-03 RX ORDER — CLONAZEPAM 0.5 MG/1
TABLET ORAL
Qty: 180 TABLET | Refills: 0 | Status: SHIPPED | OUTPATIENT
Start: 2022-01-03 | End: 2022-02-16 | Stop reason: SDUPTHER

## 2022-02-16 ENCOUNTER — OFFICE VISIT (OUTPATIENT)
Dept: FAMILY MEDICINE CLINIC | Age: 52
End: 2022-02-16
Payer: COMMERCIAL

## 2022-02-16 VITALS
DIASTOLIC BLOOD PRESSURE: 89 MMHG | HEIGHT: 69 IN | BODY MASS INDEX: 31.81 KG/M2 | OXYGEN SATURATION: 98 % | SYSTOLIC BLOOD PRESSURE: 134 MMHG | HEART RATE: 69 BPM | WEIGHT: 214.8 LBS

## 2022-02-16 DIAGNOSIS — F41.9 ANXIETY: ICD-10-CM

## 2022-02-16 DIAGNOSIS — E66.3 OVERWEIGHT: Primary | ICD-10-CM

## 2022-02-16 PROCEDURE — 99214 OFFICE O/P EST MOD 30 MIN: CPT | Performed by: STUDENT IN AN ORGANIZED HEALTH CARE EDUCATION/TRAINING PROGRAM

## 2022-02-16 RX ORDER — CLONAZEPAM 0.5 MG/1
0.5 TABLET ORAL 2 TIMES DAILY PRN
Qty: 180 TABLET | Refills: 1 | Status: SHIPPED | OUTPATIENT
Start: 2022-02-16 | End: 2022-09-27

## 2022-02-16 ASSESSMENT — ANXIETY QUESTIONNAIRES
3. WORRYING TOO MUCH ABOUT DIFFERENT THINGS: 1-SEVERAL DAYS
GAD7 TOTAL SCORE: 7
2. NOT BEING ABLE TO STOP OR CONTROL WORRYING: 1-SEVERAL DAYS
4. TROUBLE RELAXING: 2-OVER HALF THE DAYS
5. BEING SO RESTLESS THAT IT IS HARD TO SIT STILL: 0-NOT AT ALL
1. FEELING NERVOUS, ANXIOUS, OR ON EDGE: 1-SEVERAL DAYS
6. BECOMING EASILY ANNOYED OR IRRITABLE: 1-SEVERAL DAYS
7. FEELING AFRAID AS IF SOMETHING AWFUL MIGHT HAPPEN: 1-SEVERAL DAYS

## 2022-02-16 ASSESSMENT — PATIENT HEALTH QUESTIONNAIRE - PHQ9
SUM OF ALL RESPONSES TO PHQ QUESTIONS 1-9: 9
1. LITTLE INTEREST OR PLEASURE IN DOING THINGS: 2
SUM OF ALL RESPONSES TO PHQ9 QUESTIONS 1 & 2: 3
5. POOR APPETITE OR OVEREATING: 2
6. FEELING BAD ABOUT YOURSELF - OR THAT YOU ARE A FAILURE OR HAVE LET YOURSELF OR YOUR FAMILY DOWN: 0
SUM OF ALL RESPONSES TO PHQ QUESTIONS 1-9: 9
SUM OF ALL RESPONSES TO PHQ QUESTIONS 1-9: 9
3. TROUBLE FALLING OR STAYING ASLEEP: 2
4. FEELING TIRED OR HAVING LITTLE ENERGY: 2
2. FEELING DOWN, DEPRESSED OR HOPELESS: 1
10. IF YOU CHECKED OFF ANY PROBLEMS, HOW DIFFICULT HAVE THESE PROBLEMS MADE IT FOR YOU TO DO YOUR WORK, TAKE CARE OF THINGS AT HOME, OR GET ALONG WITH OTHER PEOPLE: 1
SUM OF ALL RESPONSES TO PHQ QUESTIONS 1-9: 9
9. THOUGHTS THAT YOU WOULD BE BETTER OFF DEAD, OR OF HURTING YOURSELF: 0
8. MOVING OR SPEAKING SO SLOWLY THAT OTHER PEOPLE COULD HAVE NOTICED. OR THE OPPOSITE, BEING SO FIGETY OR RESTLESS THAT YOU HAVE BEEN MOVING AROUND A LOT MORE THAN USUAL: 0
7. TROUBLE CONCENTRATING ON THINGS, SUCH AS READING THE NEWSPAPER OR WATCHING TELEVISION: 0

## 2022-02-16 NOTE — PROGRESS NOTES
Patient: Sophy Zaman is a 45 y.o. male who presents today with the following Chief Complaint(s):  Chief Complaint   Patient presents with    Discuss Medications     medication follow up     Anxiety    Hypertension         HPI     Anxiety  Taking klonopin as prescribed  Not missing doses  No AE of medication  Zoloft    Covid   Had booster    Shingles  Second shingles done    Flu  Had done earlier this year    Current Outpatient Medications   Medication Sig Dispense Refill    clonazePAM (KLONOPIN) 0.5 MG tablet Take 1 tablet by mouth 2 times daily as needed for Anxiety for up to 90 days. 180 tablet 1    sertraline (ZOLOFT) 50 MG tablet TAKE 1 TABLET BY MOUTH IN THE MORNING 90 tablet 0    atorvastatin (LIPITOR) 20 MG tablet Take 1 tablet by mouth daily 90 tablet 1    therapeutic multivitamin-minerals (THERAGRAN-M) tablet Take 1 tablet by mouth daily. Indications: OTC       No current facility-administered medications for this visit. Patient's past medical history, surgical history, family history, medications,  andallergies  were all reviewed and updated as appropriate today. Review of Systems  All other systems reviewed and negative    Physical Exam  Vitals reviewed. Constitutional:       Appearance: Normal appearance. HENT:      Head: Normocephalic and atraumatic. Cardiovascular:      Rate and Rhythm: Normal rate and regular rhythm. Pulmonary:      Effort: Pulmonary effort is normal.      Breath sounds: Normal breath sounds. Neurological:      General: No focal deficit present. Mental Status: He is alert and oriented to person, place, and time. Psychiatric:         Behavior: Behavior normal.         Thought Content: Thought content normal.       Vitals:    02/16/22 1452   BP: 134/89   Pulse: 69   SpO2: 98%       Assessment:  Encounter Diagnoses   Name Primary?  Anxiety     Overweight Yes       Plan:  1. Anxiety  Will continue current medication.  Planning to decrease when able to retire. UDS and medication agreement signed today  - clonazePAM (KLONOPIN) 0.5 MG tablet; Take 1 tablet by mouth 2 times daily as needed for Anxiety for up to 90 days. Dispense: 180 tablet; Refill: 1  - Drug Panel-PM-HI Res-UR Interp-A; Future  - Drug Panel-PM-HI Res-UR Interp-A    2. Overweight  - Comprehensive Metabolic Panel; Future  - Lipid Panel; Future        Return in about 6 months (around 8/16/2022) for Follow up Anxiety/Klonopin.

## 2022-02-20 LAB
6-ACETYLMORPHINE: NOT DETECTED
7-AMINOCLONAZEPAM: NOT DETECTED
ALPHA-OH-ALPRAZOLAM: NOT DETECTED
ALPHA-OH-MIDAZOLAM, URINE: NOT DETECTED
ALPRAZOLAM: NOT DETECTED
AMPHETAMINE: NOT DETECTED
BARBITURATES: NOT DETECTED
BENZOYLECGONINE: NOT DETECTED
BUPRENORPHINE: NOT DETECTED
CARISOPRODOL: NOT DETECTED
CLONAZEPAM: NOT DETECTED
CODEINE: NOT DETECTED
CREATININE URINE: 28.1 MG/DL (ref 20–400)
DIAZEPAM: NOT DETECTED
DRUGS EXPECTED: NORMAL
EER PAIN MGT DRUG PANEL, HIGH RES/EMIT U: NORMAL
ETHYL GLUCURONIDE: NOT DETECTED
FENTANYL: NOT DETECTED
GABAPENTIN: NOT DETECTED
HYDROCODONE: NOT DETECTED
HYDROMORPHONE: NOT DETECTED
LORAZEPAM: NOT DETECTED
MARIJUANA METABOLITE: NOT DETECTED
MDA: NOT DETECTED
MDEA: NOT DETECTED
MDMA URINE: NOT DETECTED
MEPERIDINE: NOT DETECTED
METHADONE: NOT DETECTED
METHAMPHETAMINE: NOT DETECTED
METHYLPHENIDATE: NOT DETECTED
MIDAZOLAM: NOT DETECTED
MORPHINE: NOT DETECTED
NALOXONE: NOT DETECTED
NORBUPRENORPHINE, FREE: NOT DETECTED
NORDIAZEPAM: NOT DETECTED
NORFENTANYL: NOT DETECTED
NORHYDROCODONE, URINE: NOT DETECTED
NOROXYCODONE: NOT DETECTED
NOROXYMORPHONE, URINE: NOT DETECTED
OXAZEPAM: NOT DETECTED
OXYCODONE: NOT DETECTED
OXYMORPHONE: NOT DETECTED
PAIN MANAGEMENT DRUG PANEL: NORMAL
PAIN MANAGEMENT DRUG PANEL: NORMAL
PCP: NOT DETECTED
PHENTERMINE: NOT DETECTED
PREGABALIN: NOT DETECTED
TAPENTADOL, URINE: NOT DETECTED
TAPENTADOL-O-SULFATE, URINE: NOT DETECTED
TEMAZEPAM: NOT DETECTED
TRAMADOL: NOT DETECTED
ZOLPIDEM: NOT DETECTED

## 2022-03-29 NOTE — TELEPHONE ENCOUNTER
Refill Request     Last Seen: Last Seen Department: 2/16/2022  Last Seen by PCP: Visit date not found    Last Written: 12/28/2021 90 Tablet 0 Refills    Next Appointment:   Future Appointments   Date Time Provider Bart Quinteros   8/16/2022  3:15 PM DO VAISHNAVI Wynne  [place] - [facility]       Future appointment scheduled      Requested Prescriptions     Pending Prescriptions Disp Refills    sertraline (ZOLOFT) 50 MG tablet [Pharmacy Med Name: Sertraline HCl Oral Tablet 50 MG] 90 tablet 0     Sig: TAKE 1 TABLET BY MOUTH IN THE MORNING

## 2022-06-27 RX ORDER — ATORVASTATIN CALCIUM 20 MG/1
TABLET, FILM COATED ORAL
Qty: 90 TABLET | Refills: 0 | Status: SHIPPED | OUTPATIENT
Start: 2022-06-27 | End: 2022-09-26

## 2022-07-20 NOTE — PROGRESS NOTES
21.8 Behavioral Health Consultation  [address], [address] [place] [address]  Psychologist  4/10/2019  2:50 PM      Time spent with Patient:30 minutes  This is patient's second [facility] appointment. Reason for Consult:  Depression, anxiety  Referring Provider: Oly Almeida DO  [address]  [address], [address] [facility] [address]      Feedback given to PCP. S:  Pt set goals to 1) make medication changes, per PCP rx 2) work on implementing a regular sleep schedule to avoid oversleeping 3) return for f/u in one week    Pt reports he made medication changes. No side effects. No mood changes. Tried to keep a sleep schedule and did for a few days but then slept in one day then next night couldn't fall asleep until late. Woke up for work but felt so tired he didn't go in to work. Still wants to work on his sleep. Lays in bed and watches TV. When home from work, stays in bed for most of the day in and out of sleep. Has been continuing to take Tylenol PM and melatonin. Mood continues to be depressed.      O:  MSE:    Appearance    alert, cooperative  Sleep disturbance Yes  Fatigue Yes  Loss of pleasure Yes  Impulsive behavior Yes  Speech    normal rate and normal volume  Mood    \"Depressed\"  Affect   Congruent to thought content and mood  Thought Content    intact  Thought Process    linear and goal directed  Associations    logical connections  Insight    Good  Judgment    Intact  Orientation    oriented to person, place, time, and general circumstances  Memory    recent and remote memory intact  Attention/Concentration    intact  Ability to understand instructions Yes  Ability to respond meaningfully Yes  Suicide Assessment    Denies SI      History:    Medications:   Current Outpatient Medications   Medication Sig Dispense Refill    sertraline (ZOLOFT) 50 MG tablet 0.5 daily x 4 days then start 1 daily in AM 30 tablet 5    clonazePAM (KLONOPIN) 0.5 MG tablet TAKE 1 TABLET BY MOUTH TWO TIMES A DAY AS NEEDED FOR ANXIETY 180 tablet 0    atorvastatin (LIPITOR) 10 MG tablet Take 1 tablet by mouth daily 30 tablet 2    naproxen (NAPROSYN) 500 MG tablet Take 1 tablet by mouth 2 times daily as needed for Pain 30 tablet 0    meloxicam (MOBIC) 15 MG tablet i po qd PRN 90 tablet 0    therapeutic multivitamin-minerals (THERAGRAN-M) tablet Take 1 tablet by mouth daily. Indications: OTC       No current facility-administered medications for this visit. Social History:   Social History     Socioeconomic History    Marital status:      Spouse name: Not on file    Number of children: Not on file    Years of education: Not on file    Highest education level: Not on file   Occupational History    Not on file   Social Needs    Financial resource strain: Not on file    Food insecurity:     Worry: Not on file     Inability: Not on file    Transportation needs:     Medical: Not on file     Non-medical: Not on file   Tobacco Use    Smoking status: Never Smoker    Smokeless tobacco: Current User     Types: Chew    Tobacco comment: chew    Substance and Sexual Activity    Alcohol use: Yes     Alcohol/week: 0.0 oz     Comment: monthly-1-2 beers    Drug use: No    Sexual activity: Yes   Lifestyle    Physical activity:     Days per week: Not on file     Minutes per session: Not on file    Stress: Not on file   Relationships    Social connections:     Talks on phone: Not on file     Gets together: Not on file     Attends [employer] service: Not on file     Active member of club or organization: Not on file     Attends meetings of clubs or organizations: Not on file     Relationship status: Not on file    Intimate partner violence:     Fear of current or ex partner: Not on file     Emotionally abused: Not on file     Physically abused: Not on file     Forced sexual activity: Not on file   Other Topics Concern    Not on file   Social History Narrative    Not on file       TOBACCO:   reports that he has never smoked.  His smokeless tobacco use includes chew. ETOH:   reports that he drinks alcohol. Family History:   Family History   Problem Relation Age of Onset    Cancer Father     Substance Abuse Father          A:  Mr. Alysa Corrales continues to struggle with anxiety, depression, and insomnia. He is addressing sleep as first treatment target for mood management. He was active and engaged during the visit. He continues to respond positively to behavioral interventions. PHQ Scores 4/10/2019 4/3/2019 2/19/2018 10/5/2016   PHQ2 Score 3 5 0 1   PHQ9 Score 12 14 0 1     Interpretation of Total Score Depression Severity: 1-4 = Minimal depression, 5-9 = Mild depression, 10-14 = Moderate depression, 15-19 = Moderately severe depression, 20-27 = Severe depression    KATHI 7 SCORE 4/10/2019 4/3/2019   KATHI-7 Total Score 13 11     Interpretation of KATHI-7 score: 5-9 = mild anxiety, 10-14 = moderate anxiety, 15+ = severe anxiety. Recommend referral to behavioral health for scores 10 or greater.       Diagnosis:    MDD, Recurrent, Moderate  KATHI  Insomnia      Plan:  Pt interventions:  [place]-setting to identify pt's primary goals for [facility]IRA PATRICIO [facility] visit / overall health and Supportive techniques  provided handout and discussed ways to improve sleep through behavior change, discussed the role of stimulus control procedures to improve sleep, CBT-i interventions focusing on sleep education, stimulus control and provided instructions on keeping a sleep log, treatment planning    Pt Behavioral Change Plan:  Pt set goals to 1) begin keeping a sleep log 2) work on implementing identified behavior changes for improved sleep, including keeping a regular sleep schedule (10pm-6am), practice stimulus control procedures, try to avoid chewing tobacco before bed 3) return for f/u in one week

## 2022-08-16 ENCOUNTER — OFFICE VISIT (OUTPATIENT)
Dept: FAMILY MEDICINE CLINIC | Age: 52
End: 2022-08-16
Payer: COMMERCIAL

## 2022-08-16 VITALS
DIASTOLIC BLOOD PRESSURE: 82 MMHG | WEIGHT: 213 LBS | SYSTOLIC BLOOD PRESSURE: 120 MMHG | HEIGHT: 69 IN | OXYGEN SATURATION: 97 % | TEMPERATURE: 97.5 F | HEART RATE: 80 BPM | BODY MASS INDEX: 31.55 KG/M2

## 2022-08-16 DIAGNOSIS — F41.9 ANXIETY: Primary | ICD-10-CM

## 2022-08-16 PROCEDURE — 99213 OFFICE O/P EST LOW 20 MIN: CPT | Performed by: STUDENT IN AN ORGANIZED HEALTH CARE EDUCATION/TRAINING PROGRAM

## 2022-08-16 ASSESSMENT — PATIENT HEALTH QUESTIONNAIRE - PHQ9
4. FEELING TIRED OR HAVING LITTLE ENERGY: 2
8. MOVING OR SPEAKING SO SLOWLY THAT OTHER PEOPLE COULD HAVE NOTICED. OR THE OPPOSITE, BEING SO FIGETY OR RESTLESS THAT YOU HAVE BEEN MOVING AROUND A LOT MORE THAN USUAL: 0
1. LITTLE INTEREST OR PLEASURE IN DOING THINGS: 3
9. THOUGHTS THAT YOU WOULD BE BETTER OFF DEAD, OR OF HURTING YOURSELF: 0
5. POOR APPETITE OR OVEREATING: 1
SUM OF ALL RESPONSES TO PHQ QUESTIONS 1-9: 9
6. FEELING BAD ABOUT YOURSELF - OR THAT YOU ARE A FAILURE OR HAVE LET YOURSELF OR YOUR FAMILY DOWN: 0
SUM OF ALL RESPONSES TO PHQ QUESTIONS 1-9: 9
SUM OF ALL RESPONSES TO PHQ9 QUESTIONS 1 & 2: 4
2. FEELING DOWN, DEPRESSED OR HOPELESS: 1
7. TROUBLE CONCENTRATING ON THINGS, SUCH AS READING THE NEWSPAPER OR WATCHING TELEVISION: 0
3. TROUBLE FALLING OR STAYING ASLEEP: 2
SUM OF ALL RESPONSES TO PHQ QUESTIONS 1-9: 9
SUM OF ALL RESPONSES TO PHQ QUESTIONS 1-9: 9

## 2022-08-16 ASSESSMENT — ANXIETY QUESTIONNAIRES
5. BEING SO RESTLESS THAT IT IS HARD TO SIT STILL: 0
4. TROUBLE RELAXING: 1
6. BECOMING EASILY ANNOYED OR IRRITABLE: 1
2. NOT BEING ABLE TO STOP OR CONTROL WORRYING: 0
7. FEELING AFRAID AS IF SOMETHING AWFUL MIGHT HAPPEN: 0
1. FEELING NERVOUS, ANXIOUS, OR ON EDGE: 1
GAD7 TOTAL SCORE: 4
3. WORRYING TOO MUCH ABOUT DIFFERENT THINGS: 1

## 2022-08-16 NOTE — PROGRESS NOTES
Patient: Sarah Rdz is a 45 y.o. male who presents today with the following Chief Complaint(s):  Chief Complaint   Patient presents with    Follow-up     Anxiety med check         HPI    Still using klonopin as prescribed. NO issues with medication. UDS up to date. Current Outpatient Medications   Medication Sig Dispense Refill    atorvastatin (LIPITOR) 20 MG tablet TAKE 1 TABLET BY MOUTH EVERY DAY 90 tablet 0    sertraline (ZOLOFT) 50 MG tablet TAKE 1 TABLET BY MOUTH IN THE MORNING 90 tablet 1    therapeutic multivitamin-minerals (THERAGRAN-M) tablet Take 1 tablet by mouth daily. Indications: OTC      clonazePAM (KLONOPIN) 0.5 MG tablet Take 1 tablet by mouth 2 times daily as needed for Anxiety for up to 90 days. 180 tablet 1     No current facility-administered medications for this visit. Patient's past medical history, surgical history, family history, medications,  andallergies  were all reviewed and updated as appropriate today. Review of Systems  All other systems reviewed and negative    Physical Exam  Vitals reviewed. Constitutional:       Appearance: Normal appearance. HENT:      Head: Normocephalic and atraumatic. Cardiovascular:      Rate and Rhythm: Normal rate and regular rhythm. Pulmonary:      Effort: Pulmonary effort is normal.      Breath sounds: Normal breath sounds. Neurological:      General: No focal deficit present. Mental Status: He is alert and oriented to person, place, and time. Psychiatric:         Behavior: Behavior normal.         Thought Content: Thought content normal.     Vitals:    08/16/22 1504   BP: 120/82   Pulse: 80   Temp: 97.5 °F (36.4 °C)   SpO2: 97%       Assessment:  Encounter Diagnosis   Name Primary? Anxiety Yes       Plan:  1. Anxiety  Doing well on Klonopin. We will continue medication. No follow-ups on file.

## 2022-09-25 NOTE — TELEPHONE ENCOUNTER
Refill Request     CONFIRM preferrred pharmacy with the patient. If Mail Order Rx - Pend for 90 day refill. Last Seen: Last Seen Department: 8/16/2022  Last Seen by PCP: 8/16/2022    Last Written: 3/29/22 1 refill     If no future appointment scheduled, route STAFF MESSAGE with patient name to the [facility] for scheduling. Next Appointment:   Future Appointments   Date Time Provider Bart Quinteros   2/16/2023  3:15 PM DO VAISHNAVI [place] - [facility]       Message sent to [address] [place] [address] to schedule appt with patient?   NO      Requested Prescriptions     Pending Prescriptions Disp Refills    sertraline (ZOLOFT) 50 MG tablet [Pharmacy Med Name: Sertraline HCl Oral Tablet 50 MG] 90 tablet 0     Sig: TAKE 1 TABLET BY MOUTH IN THE MORNING

## 2022-09-26 DIAGNOSIS — F41.9 ANXIETY: ICD-10-CM

## 2022-09-26 RX ORDER — ATORVASTATIN CALCIUM 20 MG/1
TABLET, FILM COATED ORAL
Qty: 90 TABLET | Refills: 0 | Status: SHIPPED | OUTPATIENT
Start: 2022-09-26

## 2022-09-26 NOTE — TELEPHONE ENCOUNTER
Refill Request - Controlled Substance    CONFIRM preferrred pharmacy with the patient. If Mail Order Rx - Pend for 90 day refill. Last Seen Department: 8/16/2022  Last Seen by PCP: 8/16/2022    Last Written: 2/16/22    Last UDS: 2/16/22    Med Agreement Signed On: 2/16/22    If no future appointment scheduled, route STAFF MESSAGE with patient name to the [facility] for scheduling. CONFIRM preferrred pharmacy with the patient. Next Appointment:   Future Appointments   Date Time Provider Bart Quinteros   2/16/2023  3:15 PM DO [facility]ISHNAVI [place] [facility]       Message sent to [address] [place] [address] to schedule appt with patient?   NO      Requested Prescriptions     Pending Prescriptions Disp Refills    clonazePAM (KLONOPIN) 0.5 MG tablet [Pharmacy Med Name: clonazePAM Oral Tablet 0.5 MG] 180 tablet 0     Sig: TAKE 1 TABLET BY MOUTH TWO TIMES A DAY AS NEEDED FOR ANXIETY FOR UP TO 90 DAYS

## 2022-09-26 NOTE — TELEPHONE ENCOUNTER
Refill Request     CONFIRM preferrred pharmacy with the patient. If Mail Order Rx - Pend for 90 day refill. Last Seen: Last Seen Department: 8/16/2022  Last Seen by PCP: Visit date not found    Last Written: 6/27/2022    If no future appointment scheduled, route STAFF MESSAGE with patient name to the [facility] for scheduling. Next Appointment:   Future Appointments   Date Time Provider Bart Quinteros   2/16/2023  3:15 PM DO VAISHNAVI [place] - [facility]       Message sent to [employer] to schedule appt with patient?   NO      Requested Prescriptions     Pending Prescriptions Disp Refills    atorvastatin (LIPITOR) 20 MG tablet [Pharmacy Med Name: Atorvastatin Calcium Oral Tablet 20 MG] 90 tablet 0     Sig: TAKE 1 TABLET BY MOUTH EVERY DAY

## 2022-09-27 RX ORDER — CLONAZEPAM 0.5 MG/1
TABLET ORAL
Qty: 180 TABLET | Refills: 0 | Status: SHIPPED | OUTPATIENT
Start: 2022-09-27 | End: 2022-12-26

## 2022-12-21 DIAGNOSIS — F41.9 ANXIETY: ICD-10-CM

## 2022-12-22 NOTE — TELEPHONE ENCOUNTER
Refill Request     CONFIRM preferrred pharmacy with the patient. If Mail Order Rx - Pend for 90 day refill. Last Seen: Last Seen Department: 8/16/2022  Last Seen by PCP: 8/16/2022    Last Written: 09/26/2022 90 tablet 0 refills     If no future appointment scheduled, route STAFF MESSAGE with patient name to the [facility] for scheduling. Next Appointment:   Future Appointments   Date Time Provider Bart Quinteros   2/16/2023  3:15 PM DO VAISHNAVI [place] - [place]       Message sent to [address] [place] [address] to schedule appt with patient?   NO      Requested Prescriptions     Pending Prescriptions Disp Refills    atorvastatin (LIPITOR) 20 MG tablet [Pharmacy Med Name: Atorvastatin Calcium Oral Tablet 20 MG] 90 tablet 0     Sig: TAKE 1 TABLET BY MOUTH EVERY DAY

## 2022-12-22 NOTE — TELEPHONE ENCOUNTER
Refill Request - Controlled Substance    CONFIRM preferrred pharmacy with the patient. If Mail Order Rx - Pend for 90 day refill. Last Seen Department: 8/16/2022  Last Seen by PCP: 8/16/2022    Last Written: 09/27/2022 180 tablet 0 refills     Last UDS: 02/16/2022    Med Agreement Signed On: 02/16/2022    If no future appointment scheduled, route STAFF MESSAGE with patient name to the [facility] for scheduling. CONFIRM preferrred pharmacy with the patient. Next Appointment:   Future Appointments   Date Time Provider Bart Quinteros   2/16/2023  3:15 PM DO VAISHNAVI [place] - [facility]       Message sent to [employer] to schedule appt with patient?   NO      Requested Prescriptions     Pending Prescriptions Disp Refills    clonazePAM (KLONOPIN) 0.5 MG tablet [Pharmacy Med Name: clonazePAM Oral Tablet 0.5 MG] 180 tablet 0     Sig: TAKE 1 TABLET BY MOUTH TWO TIMES A DAY AS NEEDED FOR ANXIETY FOR UP TO 90 DAYS

## 2022-12-23 RX ORDER — ATORVASTATIN CALCIUM 20 MG/1
TABLET, FILM COATED ORAL
Qty: 90 TABLET | Refills: 0 | Status: SHIPPED | OUTPATIENT
Start: 2022-12-23

## 2022-12-23 RX ORDER — CLONAZEPAM 0.5 MG/1
TABLET ORAL
Qty: 180 TABLET | Refills: 0 | Status: SHIPPED | OUTPATIENT
Start: 2022-12-26 | End: 2023-03-26

## 2023-02-13 SDOH — ECONOMIC STABILITY: HOUSING INSECURITY
IN THE LAST 12 MONTHS, WAS THERE A TIME WHEN YOU DID NOT HAVE A STEADY PLACE TO SLEEP OR SLEPT IN A SHELTER (INCLUDING NOW)?: PATIENT REFUSED

## 2023-02-13 SDOH — ECONOMIC STABILITY: FOOD INSECURITY: WITHIN THE PAST 12 MONTHS, YOU WORRIED THAT YOUR FOOD WOULD RUN OUT BEFORE YOU GOT MONEY TO BUY MORE.: PATIENT DECLINED

## 2023-02-13 SDOH — ECONOMIC STABILITY: TRANSPORTATION INSECURITY
IN THE PAST 12 MONTHS, HAS LACK OF TRANSPORTATION KEPT YOU FROM MEETINGS, WORK, OR FROM GETTING THINGS NEEDED FOR DAILY LIVING?: PATIENT DECLINED

## 2023-02-13 SDOH — ECONOMIC STABILITY: INCOME INSECURITY: HOW HARD IS IT FOR YOU TO PAY FOR THE VERY BASICS LIKE FOOD, HOUSING, MEDICAL CARE, AND HEATING?: PATIENT DECLINED

## 2023-02-13 SDOH — ECONOMIC STABILITY: FOOD INSECURITY: WITHIN THE PAST 12 MONTHS, THE FOOD YOU BOUGHT JUST DIDN'T LAST AND YOU DIDN'T HAVE MONEY TO GET MORE.: PATIENT DECLINED

## 2023-02-16 ENCOUNTER — OFFICE VISIT (OUTPATIENT)
Dept: FAMILY MEDICINE CLINIC | Age: 53
End: 2023-02-16

## 2023-02-16 VITALS — OXYGEN SATURATION: 98 % | SYSTOLIC BLOOD PRESSURE: 124 MMHG | HEART RATE: 74 BPM | DIASTOLIC BLOOD PRESSURE: 70 MMHG

## 2023-02-16 DIAGNOSIS — Z12.11 SCREEN FOR COLON CANCER: ICD-10-CM

## 2023-02-16 DIAGNOSIS — F33.1 MODERATE EPISODE OF RECURRENT MAJOR DEPRESSIVE DISORDER (HCC): ICD-10-CM

## 2023-02-16 DIAGNOSIS — Z79.899 HIGH RISK MEDICATION USE: Primary | ICD-10-CM

## 2023-02-16 DIAGNOSIS — F41.9 ANXIETY: ICD-10-CM

## 2023-02-16 DIAGNOSIS — E78.2 MIXED HYPERLIPIDEMIA: ICD-10-CM

## 2023-02-16 DIAGNOSIS — F41.0 PANIC ATTACKS: ICD-10-CM

## 2023-02-16 DIAGNOSIS — I10 DIASTOLIC HYPERTENSION: ICD-10-CM

## 2023-02-16 RX ORDER — HYDROXYZINE HYDROCHLORIDE 25 MG/1
25 TABLET, FILM COATED ORAL EVERY 8 HOURS PRN
Qty: 30 TABLET | Refills: 0 | Status: SHIPPED | OUTPATIENT
Start: 2023-02-16 | End: 2023-02-26

## 2023-02-16 ASSESSMENT — PATIENT HEALTH QUESTIONNAIRE - PHQ9
SUM OF ALL RESPONSES TO PHQ QUESTIONS 1-9: 8
1. LITTLE INTEREST OR PLEASURE IN DOING THINGS: 2
5. POOR APPETITE OR OVEREATING: 1
6. FEELING BAD ABOUT YOURSELF - OR THAT YOU ARE A FAILURE OR HAVE LET YOURSELF OR YOUR FAMILY DOWN: 0
3. TROUBLE FALLING OR STAYING ASLEEP: 2
SUM OF ALL RESPONSES TO PHQ QUESTIONS 1-9: 8
10. IF YOU CHECKED OFF ANY PROBLEMS, HOW DIFFICULT HAVE THESE PROBLEMS MADE IT FOR YOU TO DO YOUR WORK, TAKE CARE OF THINGS AT HOME, OR GET ALONG WITH OTHER PEOPLE: 1
2. FEELING DOWN, DEPRESSED OR HOPELESS: 1
9. THOUGHTS THAT YOU WOULD BE BETTER OFF DEAD, OR OF HURTING YOURSELF: 0
4. FEELING TIRED OR HAVING LITTLE ENERGY: 2
SUM OF ALL RESPONSES TO PHQ9 QUESTIONS 1 & 2: 3
SUM OF ALL RESPONSES TO PHQ QUESTIONS 1-9: 8
7. TROUBLE CONCENTRATING ON THINGS, SUCH AS READING THE NEWSPAPER OR WATCHING TELEVISION: 0
8. MOVING OR SPEAKING SO SLOWLY THAT OTHER PEOPLE COULD HAVE NOTICED. OR THE OPPOSITE, BEING SO FIGETY OR RESTLESS THAT YOU HAVE BEEN MOVING AROUND A LOT MORE THAN USUAL: 0
SUM OF ALL RESPONSES TO PHQ QUESTIONS 1-9: 8

## 2023-02-16 ASSESSMENT — ANXIETY QUESTIONNAIRES
7. FEELING AFRAID AS IF SOMETHING AWFUL MIGHT HAPPEN: 1
3. WORRYING TOO MUCH ABOUT DIFFERENT THINGS: 1
IF YOU CHECKED OFF ANY PROBLEMS ON THIS QUESTIONNAIRE, HOW DIFFICULT HAVE THESE PROBLEMS MADE IT FOR YOU TO DO YOUR WORK, TAKE CARE OF THINGS AT HOME, OR GET ALONG WITH OTHER PEOPLE: SOMEWHAT DIFFICULT
6. BECOMING EASILY ANNOYED OR IRRITABLE: 1
1. FEELING NERVOUS, ANXIOUS, OR ON EDGE: 2
GAD7 TOTAL SCORE: 7
2. NOT BEING ABLE TO STOP OR CONTROL WORRYING: 1
5. BEING SO RESTLESS THAT IT IS HARD TO SIT STILL: 0
4. TROUBLE RELAXING: 1

## 2023-02-16 NOTE — PROGRESS NOTES
Patient: Smitha Hassan is a 47 y.o. male who presents today with the following Chief Complaint(s):  Chief Complaint   Patient presents with    6 Month Follow-Up         HPI    Anxiety  Fairly well controlled, did have a panic attack last week which was the first one in a long time. Still using klonopin routinely   Current Outpatient Medications   Medication Sig Dispense Refill    hydrOXYzine HCl (ATARAX) 25 MG tablet Take 1 tablet by mouth every 8 hours as needed for Anxiety 30 tablet 0    clonazePAM (KLONOPIN) 0.5 MG tablet TAKE 1 TABLET BY MOUTH TWO TIMES A DAY AS NEEDED FOR ANXIETY FOR UP TO 90 DAYS 180 tablet 0    atorvastatin (LIPITOR) 20 MG tablet TAKE 1 TABLET BY MOUTH EVERY DAY 90 tablet 0    sertraline (ZOLOFT) 50 MG tablet TAKE 1 TABLET BY MOUTH IN THE MORNING 90 tablet 3    therapeutic multivitamin-minerals (THERAGRAN-M) tablet Take 1 tablet by mouth daily. Indications: OTC       No current facility-administered medications for this visit. Patient's past medical history, surgical history, family history, medications,  andallergies  were all reviewed and updated as appropriate today. Review of Systems  All other systems reviewed and negative    Physical Exam  Vitals:    02/16/23 1514   BP: 124/70   Pulse: 74   SpO2: 98%       Assessment:  Encounter Diagnoses   Name Primary? High risk medication use Yes    Anxiety     Panic attacks     Moderate episode of recurrent major depressive disorder (HCC)     Mixed hyperlipidemia     Diastolic hypertension     Screen for colon cancer        Plan:  1. High risk medication use  Do for yearly UDS  - Drug Panel-PM-HI Res-UR Interp-A; Future    2. Anxiety  Will use atarx PRN for panic attacks. Occurring very rarely  - hydrOXYzine HCl (ATARAX) 25 MG tablet; Take 1 tablet by mouth every 8 hours as needed for Anxiety  Dispense: 30 tablet; Refill: 0    3. Panic attacks  - hydrOXYzine HCl (ATARAX) 25 MG tablet;  Take 1 tablet by mouth every 8 hours as needed for Anxiety  Dispense: 30 tablet; Refill: 0    4. Moderate episode of recurrent major depressive disorder (HCC)  Controlled on zoloft and klonopin. 5. Mixed hyperlipidemia  On lipitor 20  - Lipid Panel; Future    6. Diastolic hypertension  At goal. Not currently on medication  - Comprehensive Metabolic Panel; Future    7. Screen for colon cancer  - AFL - Kai Tinsley MD, Gastroenterology, [facility]      No follow-ups on file.

## 2023-03-24 DIAGNOSIS — F41.9 ANXIETY: ICD-10-CM

## 2023-03-24 RX ORDER — CLONAZEPAM 0.5 MG/1
TABLET ORAL
Qty: 180 TABLET | Refills: 0 | Status: SHIPPED | OUTPATIENT
Start: 2023-03-27 | End: 2023-06-25

## 2023-06-28 DIAGNOSIS — F41.9 ANXIETY: ICD-10-CM

## 2023-06-28 RX ORDER — CLONAZEPAM 0.5 MG/1
TABLET ORAL
Qty: 180 TABLET | Refills: 0 | Status: SHIPPED | OUTPATIENT
Start: 2023-06-28 | End: 2023-09-26

## 2023-08-04 RX ORDER — ATORVASTATIN CALCIUM 20 MG/1
TABLET, FILM COATED ORAL
Qty: 90 TABLET | Refills: 0 | Status: SHIPPED | OUTPATIENT
Start: 2023-08-04

## 2023-08-04 NOTE — TELEPHONE ENCOUNTER
Refill Request     CONFIRM preferred pharmacy with the patient. If Mail Order Rx - Pend for 90 day refill. Last Seen: Last Seen Department: 2/16/2023  Last Seen by PCP: Visit date not found    Last Written: 12/23/22 90 with no refills     If no future appointment scheduled:  Review the last OV with PCP and review information for follow-up visit,  Route STAFF MESSAGE with patient name to the [facility] for scheduling with the following information:            -  Timing of next visit           -  Visit type ie Physical, OV, etc           -  Diagnoses/Reason ie. COPD, HTN - Do not use MEDICATION, Follow-up or CHECK UP - Give reason for visit      Next Appointment:   Future Appointments   Date Time Provider [address]   8/16/2023  3:30 PM DO VAISHNAVI Castan[place] - [place]       Message sent to [address] [place] [address] to schedule appt with patient?   NO      Requested Prescriptions     Pending Prescriptions Disp Refills    atorvastatin (LIPITOR) 20 MG tablet [Pharmacy Med Name: Atorvastatin Calcium Oral Tablet 20 MG] 90 tablet 0     Sig: TAKE 1 TABLET BY MOUTH EVERY DAY

## 2023-08-16 ENCOUNTER — OFFICE VISIT (OUTPATIENT)
Dept: FAMILY MEDICINE CLINIC | Age: 53
End: 2023-08-16
Payer: COMMERCIAL

## 2023-08-16 VITALS
HEIGHT: 69 IN | OXYGEN SATURATION: 98 % | SYSTOLIC BLOOD PRESSURE: 118 MMHG | HEART RATE: 74 BPM | DIASTOLIC BLOOD PRESSURE: 70 MMHG | BODY MASS INDEX: 31.84 KG/M2 | WEIGHT: 215 LBS

## 2023-08-16 DIAGNOSIS — F41.1 GAD (GENERALIZED ANXIETY DISORDER): ICD-10-CM

## 2023-08-16 DIAGNOSIS — E78.2 MIXED HYPERLIPIDEMIA: Primary | ICD-10-CM

## 2023-08-16 DIAGNOSIS — F33.1 MODERATE EPISODE OF RECURRENT MAJOR DEPRESSIVE DISORDER (HCC): ICD-10-CM

## 2023-08-16 PROCEDURE — 3074F SYST BP LT 130 MM HG: CPT | Performed by: STUDENT IN AN ORGANIZED HEALTH CARE EDUCATION/TRAINING PROGRAM

## 2023-08-16 PROCEDURE — 99214 OFFICE O/P EST MOD 30 MIN: CPT | Performed by: STUDENT IN AN ORGANIZED HEALTH CARE EDUCATION/TRAINING PROGRAM

## 2023-08-16 PROCEDURE — 3078F DIAST BP <80 MM HG: CPT | Performed by: STUDENT IN AN ORGANIZED HEALTH CARE EDUCATION/TRAINING PROGRAM

## 2023-08-16 RX ORDER — ATORVASTATIN CALCIUM 40 MG/1
40 TABLET, FILM COATED ORAL DAILY
Qty: 90 TABLET | Refills: 3 | Status: SHIPPED | OUTPATIENT
Start: 2023-08-16

## 2023-08-16 NOTE — PATIENT INSTRUCTIONS
[address], [place]   [address] [place] [address], [address] [place] [address], [address] [place] [address]   Phone: [phone]   Fax: [phone]

## 2023-08-16 NOTE — PROGRESS NOTES
Patient: Kyung Boss is a 47 y.o. male who presents today with the following Chief Complaint(s):  Chief Complaint   Patient presents with    6 Month Follow-Up         HPI    Anxiety  Klonopin   Zoloft 50mg daily  Doing well, intermittent panic attacks but rare and well controlled    HLD  Lipitor 20mg    Current Outpatient Medications   Medication Sig Dispense Refill    atorvastatin (LIPITOR) 40 MG tablet Take 1 tablet by mouth daily 90 tablet 3    clonazePAM (KLONOPIN) 0.5 MG tablet TAKE 1 TABLET BY MOUTH 2 TIMES A DAY AS NEEDED FOR ANXIETY 180 tablet 0    sertraline (ZOLOFT) 50 MG tablet TAKE 1 TABLET BY MOUTH IN THE MORNING 90 tablet 3    therapeutic multivitamin-minerals (THERAGRAN-M) tablet Take 1 tablet by mouth daily Indications: OTC       No current facility-administered medications for this visit. Patient's past medical history, surgical history, family history, medications,  andallergies  were all reviewed and updated as appropriate today. Review of Systems  All other systems reviewed and negative    Physical Exam  Vitals reviewed. Constitutional:       Appearance: Normal appearance. HENT:      Head: Normocephalic and atraumatic. Cardiovascular:      Rate and Rhythm: Normal rate and regular rhythm. Pulmonary:      Effort: Pulmonary effort is normal.      Breath sounds: Normal breath sounds. Neurological:      General: No focal deficit present. Mental Status: He is alert and oriented to person, place, and time. Psychiatric:         Behavior: Behavior normal.         Thought Content: Thought content normal.     Vitals:    08/16/23 1533   BP: 118/70   Pulse: 74   SpO2: 98%       Assessment:  Encounter Diagnoses   Name Primary? Mixed hyperlipidemia Yes    KATHI (generalized anxiety disorder)     Moderate episode of recurrent major depressive disorder ([facility])        Plan:  1. Mixed hyperlipidemia  Due for recheck on 40mg dose. Compliant with medicaiton.    - atorvastatin (LIPITOR) 40

## 2023-09-19 ENCOUNTER — OFFICE VISIT (OUTPATIENT)
Dept: FAMILY MEDICINE CLINIC | Age: 53
End: 2023-09-19
Payer: COMMERCIAL

## 2023-09-19 VITALS
DIASTOLIC BLOOD PRESSURE: 70 MMHG | HEART RATE: 68 BPM | OXYGEN SATURATION: 98 % | SYSTOLIC BLOOD PRESSURE: 118 MMHG | BODY MASS INDEX: 30.81 KG/M2 | WEIGHT: 208 LBS | HEIGHT: 69 IN

## 2023-09-19 DIAGNOSIS — E66.09 CLASS 1 OBESITY DUE TO EXCESS CALORIES WITH SERIOUS COMORBIDITY AND BODY MASS INDEX (BMI) OF 30.0 TO 30.9 IN ADULT: Primary | ICD-10-CM

## 2023-09-19 PROCEDURE — 3074F SYST BP LT 130 MM HG: CPT | Performed by: STUDENT IN AN ORGANIZED HEALTH CARE EDUCATION/TRAINING PROGRAM

## 2023-09-19 PROCEDURE — 99212 OFFICE O/P EST SF 10 MIN: CPT | Performed by: STUDENT IN AN ORGANIZED HEALTH CARE EDUCATION/TRAINING PROGRAM

## 2023-09-19 PROCEDURE — 3078F DIAST BP <80 MM HG: CPT | Performed by: STUDENT IN AN ORGANIZED HEALTH CARE EDUCATION/TRAINING PROGRAM

## 2023-09-19 NOTE — PROGRESS NOTES
Patient: Martina Thomson is a 47 y.o. male who presents today with the following Chief Complaint(s):  Chief Complaint   Patient presents with    Discuss Medications     Wegovy          HPI    Patient here today to discuss starting medication for weight loss. Does try to eat fairly healthy but has not counted carbs or made a food diary. Does not have routine exercise. Has a friend who has used wegovy with significant success. Current Outpatient Medications   Medication Sig Dispense Refill    atorvastatin (LIPITOR) 40 MG tablet Take 1 tablet by mouth daily 90 tablet 3    clonazePAM (KLONOPIN) 0.5 MG tablet TAKE 1 TABLET BY MOUTH 2 TIMES A DAY AS NEEDED FOR ANXIETY 180 tablet 0    sertraline (ZOLOFT) 50 MG tablet TAKE 1 TABLET BY MOUTH IN THE MORNING 90 tablet 3    therapeutic multivitamin-minerals (THERAGRAN-M) tablet Take 1 tablet by mouth daily Indications: OTC       No current facility-administered medications for this visit. Patient's past medical history, surgical history, family history, medications,  andallergies  were all reviewed and updated as appropriate today. Review of Systems  All other systems reviewed and negative    Physical Exam  Vitals:    09/19/23 1549   BP: 118/70   Pulse: 68   SpO2: 98%       Assessment:  Encounter Diagnosis   Name Primary? Class 1 obesity due to excess calories with serious comorbidity and body mass index (BMI) of 30.0 to 30.9 in adult Yes       Plan:  1. Class 1 obesity due to excess calories with serious comorbidity and body mass index (BMI) of 30.0 to 30.9 in adult  Discussed that I would not recommend starting medication to assist with weight loss at this time. I did recommend diet and exercise modifications, calorie counting, and use of a dietician to assist with weight loss. No follow-ups on file.

## 2023-09-28 DIAGNOSIS — F41.9 ANXIETY: ICD-10-CM

## 2023-09-28 NOTE — TELEPHONE ENCOUNTER
Refill Request - Controlled Substance    CONFIRM preferred pharmacy with the patient. If Mail Order Rx - Pend for 90 day refill. Last Seen Department: 9/19/2023  Last Seen by PCP: 9/19/2023    Last Written: 6/28/23    Last UDS: 2/17/23    Med Agreement Signed On: 2/16/22    If no future appointment scheduled:  Review the last OV with PCP and review information for follow-up visit,  Route STAFF MESSAGE with patient name to the [facility] for scheduling with the following information:            -  Timing of next visit           -  Visit type ie Physical, OV, etc           -  Diagnoses/Reason ie. COPD, HTN - Do not use MEDICATION, Follow-up or CHECK UP - Give reason for visit        Next Appointment:   Future Appointments   Date Time Provider [address]   2/19/2024  3:30 PM DO VAISHNAVI Lozoya [place] - [place]YD       Message sent to [facility] to schedule appt with patient?   NO      Requested Prescriptions     Pending Prescriptions Disp Refills    clonazePAM (KLONOPIN) 0.5 MG tablet [Pharmacy Med Name: clonazePAM Oral Tablet 0.5 MG] 180 tablet 0     Sig: TAKE 1 TABLET BY MOUTH 2 TIMES A DAY AS NEEDED FOR ANXIETY

## 2023-09-29 RX ORDER — CLONAZEPAM 0.5 MG/1
TABLET ORAL
Qty: 180 TABLET | Refills: 0 | Status: SHIPPED | OUTPATIENT
Start: 2023-09-29 | End: 2023-12-28

## 2023-11-14 DIAGNOSIS — F41.9 ANXIETY: Primary | ICD-10-CM

## 2023-11-14 NOTE — TELEPHONE ENCOUNTER
Refill Request     CONFIRM preferred pharmacy with the patient. If Mail Order Rx - Pend for 90 day refill. Last Seen: Last Seen Department: 9/19/2023  Last Seen by PCP: 9/19/2023    Last Written: 9/26/2022    If no future appointment scheduled:  Review the last OV with PCP and review information for follow-up visit,  Route STAFF MESSAGE with patient name to the [facility] for scheduling with the following information:            -  Timing of next visit           -  Visit type ie Physical, OV, etc           -  Diagnoses/Reason ie. COPD, HTN - Do not use MEDICATION, Follow-up or CHECK UP - Give reason for visit      Next Appointment:   Future Appointments   Date Time Provider [address]   2/19/2024  3:30 PM Michelle Kong, DO VAISHNAVI FATIMA [place] - DYD       Message sent to [place] to schedule appt with patient?   NO      Requested Prescriptions     Pending Prescriptions Disp Refills    sertraline (ZOLOFT) 50 MG tablet [Pharmacy Med Name: Sertraline HCl Oral Tablet 50 MG] 90 tablet 3     Sig: TAKE 1 TABLET BY MOUTH IN THE MORNING

## 2023-12-22 DIAGNOSIS — F41.9 ANXIETY: ICD-10-CM

## 2023-12-22 NOTE — TELEPHONE ENCOUNTER
Refill Request     CONFIRM preferred pharmacy with the patient. If Mail Order Rx - Pend for 90 day refill. Last Seen: Last Seen Department: 9/19/2023  Last Seen by PCP: 9/19/2023    Last Written: 9/29/2023, #180, 0 refills    If no future appointment scheduled:  Review the last OV with PCP and review information for follow-up visit,  Route STAFF MESSAGE with patient name to the [facility] for scheduling with the following information:            -  Timing of next visit           -  Visit type ie Physical, OV, etc           -  Diagnoses/Reason ie. COPD, HTN - Do not use MEDICATION, Follow-up or CHECK UP - Give reason for visit      Next Appointment:   Future Appointments   Date Time Provider [address]   2/19/2024  3:30 PM Dany Kong DO [place]  [place] - D[place]O       Message sent to [address] [place] [address] to schedule appt with patient?   N/A      Requested Prescriptions     Pending Prescriptions Disp Refills    clonazePAM (KLONOPIN) 0.5 MG tablet [Pharmacy Med Name: clonazePAM Oral Tablet 0.5 MG] 180 tablet 0     Sig: TAKE 1 TABLET BY MOUTH 2 TIMES A DAY AS NEEDED FOR ANXIETY

## 2023-12-26 RX ORDER — CLONAZEPAM 0.5 MG/1
TABLET ORAL
Qty: 180 TABLET | Refills: 0 | Status: SHIPPED | OUTPATIENT
Start: 2023-12-26 | End: 2024-03-25

## 2024-01-24 ENCOUNTER — OFFICE VISIT (OUTPATIENT)
Dept: FAMILY MEDICINE CLINIC | Age: 54
End: 2024-01-24
Payer: COMMERCIAL

## 2024-01-24 VITALS
HEART RATE: 73 BPM | BODY MASS INDEX: 32.91 KG/M2 | HEIGHT: 69 IN | WEIGHT: 222.2 LBS | OXYGEN SATURATION: 100 % | SYSTOLIC BLOOD PRESSURE: 130 MMHG | DIASTOLIC BLOOD PRESSURE: 76 MMHG

## 2024-01-24 DIAGNOSIS — F41.9 ANXIETY: Primary | ICD-10-CM

## 2024-01-24 DIAGNOSIS — F33.1 MODERATE EPISODE OF RECURRENT MAJOR DEPRESSIVE DISORDER (HCC): ICD-10-CM

## 2024-01-24 PROCEDURE — 3078F DIAST BP <80 MM HG: CPT | Performed by: STUDENT IN AN ORGANIZED HEALTH CARE EDUCATION/TRAINING PROGRAM

## 2024-01-24 PROCEDURE — 99213 OFFICE O/P EST LOW 20 MIN: CPT | Performed by: STUDENT IN AN ORGANIZED HEALTH CARE EDUCATION/TRAINING PROGRAM

## 2024-01-24 PROCEDURE — 3075F SYST BP GE 130 - 139MM HG: CPT | Performed by: STUDENT IN AN ORGANIZED HEALTH CARE EDUCATION/TRAINING PROGRAM

## 2024-01-24 ASSESSMENT — PATIENT HEALTH QUESTIONNAIRE - PHQ9
2. FEELING DOWN, DEPRESSED OR HOPELESS: 2
SUM OF ALL RESPONSES TO PHQ QUESTIONS 1-9: 9
9. THOUGHTS THAT YOU WOULD BE BETTER OFF DEAD, OR OF HURTING YOURSELF: 0
4. FEELING TIRED OR HAVING LITTLE ENERGY: 2
6. FEELING BAD ABOUT YOURSELF - OR THAT YOU ARE A FAILURE OR HAVE LET YOURSELF OR YOUR FAMILY DOWN: 0
3. TROUBLE FALLING OR STAYING ASLEEP: 2
5. POOR APPETITE OR OVEREATING: 1
SUM OF ALL RESPONSES TO PHQ QUESTIONS 1-9: 9
8. MOVING OR SPEAKING SO SLOWLY THAT OTHER PEOPLE COULD HAVE NOTICED. OR THE OPPOSITE, BEING SO FIGETY OR RESTLESS THAT YOU HAVE BEEN MOVING AROUND A LOT MORE THAN USUAL: 0
SUM OF ALL RESPONSES TO PHQ QUESTIONS 1-9: 9
SUM OF ALL RESPONSES TO PHQ QUESTIONS 1-9: 9
SUM OF ALL RESPONSES TO PHQ9 QUESTIONS 1 & 2: 4
7. TROUBLE CONCENTRATING ON THINGS, SUCH AS READING THE NEWSPAPER OR WATCHING TELEVISION: 0
1. LITTLE INTEREST OR PLEASURE IN DOING THINGS: 2

## 2024-01-24 ASSESSMENT — ANXIETY QUESTIONNAIRES
3. WORRYING TOO MUCH ABOUT DIFFERENT THINGS: 1
5. BEING SO RESTLESS THAT IT IS HARD TO SIT STILL: 0
2. NOT BEING ABLE TO STOP OR CONTROL WORRYING: 1
4. TROUBLE RELAXING: 1
7. FEELING AFRAID AS IF SOMETHING AWFUL MIGHT HAPPEN: 0
1. FEELING NERVOUS, ANXIOUS, OR ON EDGE: 2
GAD7 TOTAL SCORE: 6
6. BECOMING EASILY ANNOYED OR IRRITABLE: 1
IF YOU CHECKED OFF ANY PROBLEMS ON THIS QUESTIONNAIRE, HOW DIFFICULT HAVE THESE PROBLEMS MADE IT FOR YOU TO DO YOUR WORK, TAKE CARE OF THINGS AT HOME, OR GET ALONG WITH OTHER PEOPLE: NOT DIFFICULT AT ALL

## 2024-01-24 NOTE — PROGRESS NOTES
Assessment:  Encounter Diagnoses   Name Primary?    Anxiety Yes    Moderate episode of recurrent major depressive disorder (HCC)        Plan:  1. Anxiety  2. Moderate episode of recurrent major depressive disorder (HCC)  Patient typically well-controlled on current medication regimen.  Will continue with current medications and complete FMLA paperwork.  See scanned media.    Spent a total of 26 minutes with the patient, with >50% of visit discussing the pathophysiology, etiology, risks, and principles of treatment of above.      No follow-ups on file.      Patient: Trent Barrera is a 53 y.o. male who presents today with the following Chief Complaint(s):  Chief Complaint   Patient presents with    Forms     FMLA         HPI    Reports that he has had two episodes of increased anxiety that cause patient to miss 3 days during each occurrence.   Felt that during period of exacerbation he will be unable to sleep more than 2-3 hours and feels unsafe to drive or focus on tasks to perform regular job duties. These episodes will typically last about 2-3 days at a time  Follows with me 2 times a year. Typically symptoms well controlled on klonopin and zoloft.   Would excuse for 3 day periods twice a year for exacerbations    Current Outpatient Medications   Medication Sig Dispense Refill    clonazePAM (KLONOPIN) 0.5 MG tablet TAKE 1 TABLET BY MOUTH 2 TIMES A DAY AS NEEDED FOR ANXIETY 180 tablet 0    sertraline (ZOLOFT) 50 MG tablet TAKE 1 TABLET BY MOUTH IN THE MORNING 90 tablet 3    atorvastatin (LIPITOR) 40 MG tablet Take 1 tablet by mouth daily 90 tablet 3    therapeutic multivitamin-minerals (THERAGRAN-M) tablet Take 1 tablet by mouth daily Indications: OTC       No current facility-administered medications for this visit.       Patient's past medical history, surgical history, family history, medications,  andallergies  were all reviewed and updated as appropriate today.      Review of Systems  All other systems reviewed

## 2024-02-01 ENCOUNTER — TELEPHONE (OUTPATIENT)
Dept: FAMILY MEDICINE CLINIC | Age: 54
End: 2024-02-01

## 2024-02-01 NOTE — TELEPHONE ENCOUNTER
Left message for patient to call the office back. Please let patient know that we have been trying to fax his [place] paperwork but its not going through.

## 2024-03-04 DIAGNOSIS — F41.9 ANXIETY: ICD-10-CM

## 2024-03-04 DIAGNOSIS — E78.2 MIXED HYPERLIPIDEMIA: ICD-10-CM

## 2024-03-04 RX ORDER — ATORVASTATIN CALCIUM 40 MG/1
40 TABLET, FILM COATED ORAL DAILY
Qty: 6 TABLET | Refills: 0 | Status: SHIPPED | OUTPATIENT
Start: 2024-03-04

## 2024-03-04 NOTE — TELEPHONE ENCOUNTER
Addended by: JOHNY ARCHULETA on: 12/29/2022 05:31 PM     Modules accepted: Orders     Pt is out of town for a family emergency and death in family did not take enough medication pended below with pharmacy per pt preference please advise.

## 2024-03-16 SDOH — ECONOMIC STABILITY: INCOME INSECURITY: HOW HARD IS IT FOR YOU TO PAY FOR THE VERY BASICS LIKE FOOD, HOUSING, MEDICAL CARE, AND HEATING?: PATIENT DECLINED

## 2024-03-16 SDOH — ECONOMIC STABILITY: FOOD INSECURITY: WITHIN THE PAST 12 MONTHS, YOU WORRIED THAT YOUR FOOD WOULD RUN OUT BEFORE YOU GOT MONEY TO BUY MORE.: PATIENT DECLINED

## 2024-03-16 SDOH — ECONOMIC STABILITY: FOOD INSECURITY: WITHIN THE PAST 12 MONTHS, THE FOOD YOU BOUGHT JUST DIDN'T LAST AND YOU DIDN'T HAVE MONEY TO GET MORE.: PATIENT DECLINED

## 2024-03-16 SDOH — ECONOMIC STABILITY: HOUSING INSECURITY
IN THE LAST 12 MONTHS, WAS THERE A TIME WHEN YOU DID NOT HAVE A STEADY PLACE TO SLEEP OR SLEPT IN A SHELTER (INCLUDING NOW)?: PATIENT DECLINED

## 2024-03-19 ENCOUNTER — OFFICE VISIT (OUTPATIENT)
Dept: FAMILY MEDICINE CLINIC | Age: 54
End: 2024-03-19

## 2024-03-19 VITALS
SYSTOLIC BLOOD PRESSURE: 128 MMHG | OXYGEN SATURATION: 97 % | WEIGHT: 220 LBS | DIASTOLIC BLOOD PRESSURE: 88 MMHG | HEART RATE: 82 BPM | BODY MASS INDEX: 32.49 KG/M2

## 2024-03-19 DIAGNOSIS — Z51.81 THERAPEUTIC DRUG MONITORING: Primary | ICD-10-CM

## 2024-03-19 DIAGNOSIS — F33.1 MODERATE EPISODE OF RECURRENT MAJOR DEPRESSIVE DISORDER (HCC): ICD-10-CM

## 2024-03-19 DIAGNOSIS — F41.9 ANXIETY: ICD-10-CM

## 2024-03-19 DIAGNOSIS — F41.1 GAD (GENERALIZED ANXIETY DISORDER): ICD-10-CM

## 2024-03-19 DIAGNOSIS — F41.0 PANIC ATTACKS: ICD-10-CM

## 2024-03-19 NOTE — PROGRESS NOTES
Assessment:  Encounter Diagnoses   Name Primary?    Therapeutic drug monitoring Yes    Anxiety     Panic attacks     Moderate episode of recurrent major depressive disorder (HCC)     KATHI (generalized anxiety disorder)        Plan:  1. Therapeutic drug monitoring  -     Drug Panel-PM-HI Res-UR Interp-A; Future  2. Anxiety  -     Comprehensive Metabolic Panel; Future  -     MyChart Evisit  3. Panic attacks  4. Moderate episode of recurrent major depressive disorder (HCC)  -     Comprehensive Metabolic Panel; Future  5. KATHI (generalized anxiety disorder)  -     Comprehensive Metabolic Panel; Future  Increase zoloft to 75mg to see if this improves anxiety further. Continue klonoopin. Recheck lab work as above.       No follow-ups on file.      Patient: Trent Barrera is a 53 y.o. male who presents today with the following Chief Complaint(s):  Chief Complaint   Patient presents with    6 Month Follow-Up         HPI    Anxiety  Klonopin   Zoloft 50mg daily  Doing well, intermittent panic attacks but rare and well controlled     HLD  Lipitor 40mg        1/24/2024     2:40 PM 2/16/2023     3:14 PM 8/16/2022     3:11 PM 2/16/2022     5:00 PM 5/15/2019     2:00 PM 4/24/2019     3:00 PM 4/17/2019     2:00 PM   KATHI 7 SCORE   KATHI-7 Total Score 6 7 4       KATHI-7 Total Score    7 10 9 7     Interpretation of KATHI-7 score: 5-9 = mild anxiety, 10-14 = moderate anxiety, 15+ = severe anxiety. Recommend referral to behavioral health for scores 10 or greater.    Current Outpatient Medications   Medication Sig Dispense Refill    sertraline (ZOLOFT) 50 MG tablet Take 1 tablet by mouth every morning 6 tablet 0    atorvastatin (LIPITOR) 40 MG tablet Take 1 tablet by mouth daily 6 tablet 0    clonazePAM (KLONOPIN) 0.5 MG tablet TAKE 1 TABLET BY MOUTH 2 TIMES A DAY AS NEEDED FOR ANXIETY 180 tablet 0    therapeutic multivitamin-minerals (THERAGRAN-M) tablet Take 1 tablet by mouth daily Indications: OTC       No current facility-administered

## 2024-03-20 LAB
ALBUMIN SERPL-MCNC: 5.1 G/DL (ref 3.4–5)
ALBUMIN/GLOB SERPL: 2.2 {RATIO} (ref 1.1–2.2)
ALP SERPL-CCNC: 48 U/L (ref 40–129)
ALT SERPL-CCNC: 30 U/L (ref 10–40)
ANION GAP SERPL CALCULATED.3IONS-SCNC: 8 MMOL/L (ref 3–16)
AST SERPL-CCNC: 22 U/L (ref 15–37)
BILIRUB SERPL-MCNC: 0.3 MG/DL (ref 0–1)
BUN SERPL-MCNC: 14 MG/DL (ref 7–20)
CALCIUM SERPL-MCNC: 9.9 MG/DL (ref 8.3–10.6)
CHLORIDE SERPL-SCNC: 102 MMOL/L (ref 99–110)
CO2 SERPL-SCNC: 27 MMOL/L (ref 21–32)
CREAT SERPL-MCNC: 0.9 MG/DL (ref 0.9–1.3)
GFR SERPLBLD CREATININE-BSD FMLA CKD-EPI: >60 ML/MIN/{1.73_M2}
GLUCOSE SERPL-MCNC: 75 MG/DL (ref 70–99)
POTASSIUM SERPL-SCNC: 4.1 MMOL/L (ref 3.5–5.1)
PROT SERPL-MCNC: 7.4 G/DL (ref 6.4–8.2)
SODIUM SERPL-SCNC: 137 MMOL/L (ref 136–145)

## 2024-03-22 LAB

## 2024-04-01 DIAGNOSIS — F41.9 ANXIETY: ICD-10-CM

## 2024-04-01 RX ORDER — CLONAZEPAM 0.5 MG/1
TABLET ORAL
Qty: 180 TABLET | Refills: 0 | Status: SHIPPED | OUTPATIENT
Start: 2024-04-01 | End: 2024-06-30

## 2024-04-01 NOTE — TELEPHONE ENCOUNTER
Refill Request - Controlled Substance    CONFIRM preferred pharmacy with the patient.    If Mail Order Rx - Pend for 90 day refill.        Last Seen Department: 3/19/2024  Last Seen by PCP: 3/19/2024    Last Written: 12/26/2023, #180, 0 refills    Last UDS: 3/19/2024    Med Agreement Signed On: 2/01/22    If no future appointment scheduled:  Review the last OV with PCP and review information for follow-up visit,  Route STAFF MESSAGE with patient name to the  Pool for scheduling with the following information:            -  Timing of next visit           -  Visit type ie Physical, OV, etc           -  Diagnoses/Reason ie. COPD, HTN - Do not use MEDICATION, Follow-up or CHECK UP - Give reason for visit        Next Appointment:   Future Appointments   Date Time Provider Department Center   4/23/2024 To Be Determined Provider, MD [place]  None   9/19/2024  3:30 PM [facility]  [place] [facility] [place]       Message sent to  to schedule appt with patient?  YES      Requested Prescriptions     Pending Prescriptions Disp Refills    clonazePAM (KLONOPIN) 0.5 MG tablet [Pharmacy Med Name: clonazePAM Oral Tablet 0.5 MG] 180 tablet 0     Sig: TAKE 1 TABLET BY MOUTH 2 TIMES A DAY AS NEEDED FOR ANXIETY

## 2024-04-30 SDOH — HEALTH STABILITY: PHYSICAL HEALTH: ON AVERAGE, HOW MANY MINUTES DO YOU ENGAGE IN EXERCISE AT THIS LEVEL?: PATIENT DECLINED

## 2024-04-30 SDOH — HEALTH STABILITY: PHYSICAL HEALTH: ON AVERAGE, HOW MANY DAYS PER WEEK DO YOU ENGAGE IN MODERATE TO STRENUOUS EXERCISE (LIKE A BRISK WALK)?: 3 DAYS

## 2024-04-30 NOTE — PROGRESS NOTES
[facility] New Patient Additional Questions       Question 4/30/2024  2:32 PM EDT - Filed by Patient    Please list any providers you have seen in the last 12 months and for what reason       In the past 12 Months have you had any of the following symptoms?     Headaches No    Fainting or passing out No    Sudden loss of vision, strength, or inability to speak No    Hearing loss or ringing in ear(s) No    Nosebleeds No    Coughing for more than 2-4 weeks No    Coughing up blood No    Shortness of breath or wheezing No    Swelling of feet or ankles No    Chest pain, chest pressure or heaviness No    Irregular heartbeat or suden fast heartbeat No    Difficulty swallowing or food \"sticking\" No    Frequent heartburn or indigestion No    Abdominal pain No    Frequent constipation No    Frequent diarrhea No    Rectal bleeding/black stools No    Urinating more than twice per night No    Pain in joints or bones Yes    Unusual bruising or bleeding No    Seizures, convulsions No    Change in wart, mole or skin growth No    Difficulty sleeping Yes    Tearfulness No    Difficulty concentrating No    Weight loss more than 5-10 pounds No    Irregular menstrual bleeding No    Menstrual bleeding after menopause No    Breast lumps/discharge from nipple(s) No    On average, how many days per week do you engage in moderate to strenuous exercise (like a brisk walk)? 3 days    On average, how many minutes do you engage in exercise at this level? Patient declined    Please upload an image of your Drivers License.  [Media Unavailable] Upload from 4/30/2024  2:32 PM EDT    Which option best describes your E-cigarette/Vaping usage? Never Used

## 2024-05-01 ENCOUNTER — OFFICE VISIT (OUTPATIENT)
Dept: ORTHOPEDIC SURGERY | Age: 54
End: 2024-05-01

## 2024-05-01 VITALS — BODY MASS INDEX: 32.73 KG/M2 | RESPIRATION RATE: 16 BRPM | HEIGHT: 69 IN | WEIGHT: 221 LBS

## 2024-05-01 DIAGNOSIS — S43.401A SPRAIN OF RIGHT SHOULDER, UNSPECIFIED SHOULDER SPRAIN TYPE, INITIAL ENCOUNTER: Primary | ICD-10-CM

## 2024-05-01 DIAGNOSIS — S49.92XA INJURY OF LEFT SHOULDER, INITIAL ENCOUNTER: ICD-10-CM

## 2024-05-01 RX ORDER — METHYLPREDNISOLONE 4 MG/1
TABLET ORAL
Qty: 1 KIT | Refills: 0 | Status: SHIPPED | OUTPATIENT
Start: 2024-05-01 | End: 2024-05-07

## 2024-05-01 RX ORDER — CYCLOBENZAPRINE HCL 10 MG
10 TABLET ORAL 3 TIMES DAILY PRN
Qty: 30 TABLET | Refills: 0 | Status: SHIPPED | OUTPATIENT
Start: 2024-05-01 | End: 2024-05-11

## 2024-05-01 NOTE — PROGRESS NOTES
CHIEF COMPLAINT: Left shoulder pain    DATE OF INJURY: 4/26/24    History:    Trent Barrera is a 53 y.o. right handed male self-referred for evaluation and treatment of Left shoulder pain.   This is evaluated as a workers compensation injury.   The pain began 5 days ago.  Pain is rated as a 4-10/10.   There was an injury.  He was cutting copper cable and jerked his arm/shoulder.  Pain is located laterally and in his axilla.  Symptoms are worse with movement of the shoulder and neck.  He does note numbness and tingling in all his fingers.  He does complain of pain shooting down his arm.  He does have a history of 2 prior left shoulder surgeries, 1 involving his rotator cuff.  He does not remember who the surgeon was.  He states that was 10-15 years ago.  The patient has not had PT. The patient has not had an injection.   The patient has tried NSAIDs, without relief. The patient has tried ice, without relief.   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      Past Medical History:   Diagnosis Date    Anxiety     Chronic low back pain     level 5    Degenerative disc disease, lumbar 4/9/2015    Hyperlipidemia     Hypertension     Moderate episode of recurrent major depressive disorder (HCC) 4/3/2019    Sleep apnea     uses c-pap       Past Surgical History:   Procedure Laterality Date    BACK SURGERY      \"nerves burnt in back\"    COLONOSCOPY N/A 2/4/2020    COLONOSCOPY POLYPECTOMY SNARE/COLD BIOPSY performed by Edenilson Anthony MD at [facility] ENDOSCOPY    COLONOSCOPY N/A 2/4/2020    COLONOSCOPY CONTROL HEMORRHAGE performed by Edenilson Anthony MD at [facility] ENDOSCOPY    KNEE SURGERY      SHOULDER SURGERY  1992,1999, 2000    right  torn labrum, left torn labrum       Current Outpatient Medications on File Prior to Visit   Medication Sig Dispense Refill    clonazePAM (KLONOPIN) 0.5 MG tablet TAKE 1 TABLET BY MOUTH 2 TIMES A DAY AS NEEDED FOR

## 2024-05-15 ENCOUNTER — OFFICE VISIT (OUTPATIENT)
Dept: ORTHOPEDIC SURGERY | Age: 54
End: 2024-05-15

## 2024-05-15 ENCOUNTER — TELEPHONE (OUTPATIENT)
Dept: ORTHOPEDIC SURGERY | Age: 54
End: 2024-05-15

## 2024-05-15 VITALS — HEIGHT: 69 IN | BODY MASS INDEX: 32.58 KG/M2 | WEIGHT: 220 LBS | RESPIRATION RATE: 16 BRPM

## 2024-05-15 DIAGNOSIS — S43.402A SPRAIN OF LEFT SHOULDER, UNSPECIFIED SHOULDER SPRAIN TYPE, INITIAL ENCOUNTER: Primary | ICD-10-CM

## 2024-05-15 NOTE — PROGRESS NOTES
CHIEF COMPLAINT: Left shoulder pain    DATE OF INJURY: 4/26/24    History:    Trent Barrera is a 53 y.o. right handed male self-referred for evaluation and treatment of Left shoulder pain.   This is evaluated as a workers compensation injury.   The pain began 5 days ago.  Pain is rated as a 4-10/10.   There was an injury.  He was cutting copper cable and jerked his arm/shoulder.  Pain is located laterally and in his axilla.  Symptoms are worse with movement of the shoulder and neck.  He does note numbness and tingling in all his fingers.  He does complain of pain shooting down his arm.  He does have a history of 2 prior left shoulder surgeries, 1 involving his rotator cuff.  He does not remember who the surgeon was.  He states that was 10-15 years ago.  The patient has not had PT. The patient has not had an injection.   The patient has tried NSAIDs, without relief. The patient has tried ice, without relief.   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    Interval History: His shoulder actually feels worse.  He rates pain 7/10.  He states the Medrol did not provide any significant relief.  She does use the Flexeril to help him sleep.  He complains of pain mostly in his axilla now.  He also notes numbness and tingling in all his fingers.           Past Medical History:   Diagnosis Date    Anxiety     Chronic low back pain     level 5    Degenerative disc disease, lumbar 4/9/2015    Hyperlipidemia     Hypertension     Moderate episode of recurrent major depressive disorder (HCC) 4/3/2019    Sleep apnea     uses c-pap       Past Surgical History:   Procedure Laterality Date    BACK SURGERY      \"nerves burnt in back\"    COLONOSCOPY N/A 2/4/2020    COLONOSCOPY POLYPECTOMY SNARE/COLD BIOPSY performed by Edenilson Anthony MD at [facility] ENDOSCOPY    COLONOSCOPY N/A 2/4/2020    COLONOSCOPY CONTROL HEMORRHAGE performed by Edenilson Anthony MD at

## 2024-05-15 NOTE — TELEPHONE ENCOUNTER
Please complete C9 request for:    Physical Therapy (order in chart)  Injection left shoulder (49163; )

## 2024-05-23 ENCOUNTER — TELEPHONE (OUTPATIENT)
Dept: ORTHOPEDIC SURGERY | Age: 54
End: 2024-05-23

## 2024-05-23 NOTE — TELEPHONE ENCOUNTER
Received C9 approval for left shoulder injection and physical therapy.    LM for patient and sent [email or web address] message

## 2024-05-29 ENCOUNTER — OFFICE VISIT (OUTPATIENT)
Dept: ORTHOPEDIC SURGERY | Age: 54
End: 2024-05-29

## 2024-05-29 VITALS — RESPIRATION RATE: 16 BRPM | HEIGHT: 69 IN | BODY MASS INDEX: 32.58 KG/M2 | WEIGHT: 220 LBS

## 2024-05-29 DIAGNOSIS — S43.402A SPRAIN OF LEFT SHOULDER, UNSPECIFIED SHOULDER SPRAIN TYPE, INITIAL ENCOUNTER: Primary | ICD-10-CM

## 2024-05-29 RX ORDER — TRIAMCINOLONE ACETONIDE 40 MG/ML
40 INJECTION, SUSPENSION INTRA-ARTICULAR; INTRAMUSCULAR ONCE
Status: COMPLETED | OUTPATIENT
Start: 2024-05-29 | End: 2024-05-29

## 2024-05-29 RX ORDER — LIDOCAINE HYDROCHLORIDE 10 MG/ML
4 INJECTION, SOLUTION INFILTRATION; PERINEURAL ONCE
Status: COMPLETED | OUTPATIENT
Start: 2024-05-29 | End: 2024-05-29

## 2024-05-29 RX ORDER — BUPIVACAINE HYDROCHLORIDE 2.5 MG/ML
4 INJECTION, SOLUTION INFILTRATION; PERINEURAL ONCE
Status: COMPLETED | OUTPATIENT
Start: 2024-05-29 | End: 2024-05-29

## 2024-05-29 RX ORDER — GABAPENTIN 300 MG/1
300 CAPSULE ORAL NIGHTLY
Qty: 30 CAPSULE | Refills: 0 | Status: SHIPPED | OUTPATIENT
Start: 2024-05-29 | End: 2024-06-28

## 2024-05-29 RX ADMIN — TRIAMCINOLONE ACETONIDE 40 MG: 40 INJECTION, SUSPENSION INTRA-ARTICULAR; INTRAMUSCULAR at 10:47

## 2024-05-29 RX ADMIN — LIDOCAINE HYDROCHLORIDE 4 ML: 10 INJECTION, SOLUTION INFILTRATION; PERINEURAL at 10:46

## 2024-05-29 RX ADMIN — BUPIVACAINE HYDROCHLORIDE 10 MG: 2.5 INJECTION, SOLUTION INFILTRATION; PERINEURAL at 10:46

## 2024-05-29 NOTE — PROGRESS NOTES
PROCEDURE NOTE:    PRE-PROCEDURE DIAGNOSIS: Left shoulder sprain, SLAP tear    POST-PROCEDURE DIAGNOSIS: Left shoulder sprain, SLAP tear     PROCEDURE:  The risks and benefits of an injection were discussed with the patient.  The patient had full opportunity to ask questions and all were answered.  The patient then provided verbal informed consent.  The skin was then prepped with betadine solution and alcohol.  Under aseptic conditions, the  left glenohumeral joint was injected with 4cc of 1% xylocaine, 4cc of 0.25% marcaine, and 1cc of Kenalog (40mg/ml).  There were no immediate complications following the injection.  The patient was advised of the possibility of injection site reaction and instructed to apply ice to the area and take NSAIDs if able.           POST-PROCEDURE INSTRUCTIONS GIVEN TO PATIENT: The patient was advised to ice the knee for 15-20 minutes to relieve any injection site related pain.    I will add a trial of gabapentin to the current regimen. Counseled on risks, benefits and alternatives and recommended not to take the medicine and drive or operate heavy machinery.       FOLLOW-UP: as directed.            ANN Ayala, PA-C  Board Certified by the National Committee on Certification of Physician Assistants  [facility]

## 2024-05-31 ENCOUNTER — HOSPITAL ENCOUNTER (OUTPATIENT)
Dept: PHYSICAL THERAPY | Age: 54
Setting detail: THERAPIES SERIES
Discharge: HOME | End: 2024-05-31
Payer: COMMERCIAL

## 2024-05-31 PROCEDURE — 20560 NDL INSJ W/O NJX 1 OR 2 MUSC: CPT

## 2024-05-31 PROCEDURE — 97110 THERAPEUTIC EXERCISES: CPT

## 2024-05-31 PROCEDURE — 97140 MANUAL THERAPY 1/> REGIONS: CPT

## 2024-05-31 PROCEDURE — 97161 PT EVAL LOW COMPLEX 20 MIN: CPT

## 2024-05-31 NOTE — PLAN OF CARE
[facility]- Outpatient Rehabilitation and Therapy [address], [place] [address] office: [phone] fax: [phone]     Physical Therapy Initial Evaluation Certification      Dear Gume Sanchez MD,    We had the pleasure of evaluating the following patient for physical therapy services at [facility].  A summary of our findings can be found in the initial assessment below.  This includes our plan of care.  If you have any questions or concerns regarding these findings, please do not hesitate to contact me at the office phone number listed above.  Thank you for the referral.     Physician Signature:_______________________________Date:__________________  By signing above (or electronic signature), therapist’s plan is approved by physician       Physical Therapy: TREATMENT/PROGRESS NOTE   Patient: Trent Barrera (53 y.o. male)   Examination Date: 2024   :  1970 MRN: [id number]   Visit #: 1   Insurance Allowable Auth Needed   ? []Yes    []No    Insurance: Payor: [employer]EL TPA / Plan: CORVEL TPA / Product Type: *No Product type* /   Insurance ID: [id number] - (Worker's Comp)  Secondary Insurance (if applicable):    Treatment Diagnosis: L shoulder pain  No diagnosis found.   Medical Diagnosis:  No admission diagnoses are documented for this encounter.   Referring Physician: Gume Sanchez MD  PCP: Onur Kong DO     Plan of care signed (Y/N):     Date of Patient follow up with Physician:      Progress Report/POC: YES, Date Range for this report: 24 to 24  POC update due: (10 visits /OR AUTH LIMITS, whichever is less)  2024                                             Precautions/ Contra-indications:           Latex allergy:  NO  Pacemaker:    NO  Contraindications for Manipulation: None  Date of Surgery: na  Other:    Red Flags:  Chest pain    C-SSRS Triggered by Intake questionnaire:   Patient

## 2024-06-03 ENCOUNTER — HOSPITAL ENCOUNTER (OUTPATIENT)
Dept: PHYSICAL THERAPY | Age: 54
Setting detail: THERAPIES SERIES
Discharge: HOME | End: 2024-06-03
Payer: COMMERCIAL

## 2024-06-03 PROCEDURE — 97112 NEUROMUSCULAR REEDUCATION: CPT

## 2024-06-03 PROCEDURE — 97140 MANUAL THERAPY 1/> REGIONS: CPT

## 2024-06-03 PROCEDURE — 20560 NDL INSJ W/O NJX 1 OR 2 MUSC: CPT

## 2024-06-03 PROCEDURE — 97110 THERAPEUTIC EXERCISES: CPT

## 2024-06-03 NOTE — FLOWSHEET NOTE
will demonstrate increased AROM of L shoulder to meet R shoulder ROM without pain to allow for proper joint functioning to enable patient to lift overhead.   [] Progressing: [] Met: [] Not Met: [] Adjusted  3. Patient will demonstrate increased Strength of L shoulder to within 5lb with HHD of contralateral limb to allow for proper functional mobility to enable patient to return to recreational activities.   [] Progressing: [] Met: [] Not Met: [] Adjusted  4. Patient will return to ADL's without increased symptoms or restriction.   [] Progressing: [] Met: [] Not Met: [] Adjusted  5. Pt will demonstrate the ability to lift weight overhead with 0/10 pain. (patient specific functional goal)    [] Progressing: [] Met: [] Not Met: [] Adjusted     Overall Progression Towards Functional goals/ Treatment Progress Update:  [] Patient is progressing as expected towards functional goals listed.    [] Progression is slowed due to complexities/Impairments listed.  [] Progression has been slowed due to co-morbidities.  [x] Plan just implemented, too soon (<30days) to assess goals progression   [] Goals require adjustment due to lack of progress  [] Patient is not progressing as expected and requires additional follow up with physician  [] Other:     TREATMENT PLAN     Frequency/Duration: 2x/week for  12  weeks for the following treatment interventions:    Interventions:  Therapeutic Exercise (84979) including: strength training, ROM, and functional mobility  Therapeutic Activities (46311) including: functional mobility training and education.  Neuromuscular Re-education (47467) activation and proprioception, including postural re-education.    Gait Training (22782) for normalization of ambulation patterns and AD training.   Manual Therapy (33664) as indicated to include: Passive Range of Motion, Gr I-IV mobilizations, Soft Tissue Mobilization, Dry Needling/IASTM, and Manual Lymph Drainage  Modalities as needed that may include:

## 2024-06-06 ENCOUNTER — HOSPITAL ENCOUNTER (OUTPATIENT)
Dept: PHYSICAL THERAPY | Age: 54
Setting detail: THERAPIES SERIES
Discharge: HOME | End: 2024-06-06
Payer: COMMERCIAL

## 2024-06-06 PROCEDURE — 97140 MANUAL THERAPY 1/> REGIONS: CPT

## 2024-06-06 PROCEDURE — 97110 THERAPEUTIC EXERCISES: CPT

## 2024-06-06 PROCEDURE — 20560 NDL INSJ W/O NJX 1 OR 2 MUSC: CPT

## 2024-06-06 NOTE — FLOWSHEET NOTE
[facility]- Outpatient Rehabilitation and Therapy [address], [place] [address] office: [phone] fax: [phone]           Physical Therapy: TREATMENT/PROGRESS NOTE   Patient: Trent Barrera (53 y.o. male)   Examination Date: 2024   :  1970 MRN: [id number]   Visit #: 3   Insurance Allowable Auth Needed   12 BWC [x]Yes    []No    Insurance: Payor: [employer]EL TPA / Plan: CORVEL TPA / Product Type: *No Product type* /   Insurance ID: [id number] - (Worker's Comp)  Secondary Insurance (if applicable):    Treatment Diagnosis: L shoulder pain  No diagnosis found.   Medical Diagnosis:  No admission diagnoses are documented for this encounter.   Referring Physician: Gume Sanchez MD  PCP: Onur Kong DO     Plan of care signed (Y/N):     Date of Patient follow up with Physician:      Progress Report/POC: YES, Date Range for this report: 24 to 24  POC update due: (10 visits /OR AUTH LIMITS, whichever is less)  2024                                             Precautions/ Contra-indications:           Latex allergy:  NO  Pacemaker:    NO  Contraindications for Manipulation: None  Date of Surgery: na  Other:    Red Flags:  Chest pain    C-SSRS Triggered by Intake questionnaire:   Patient answered 'NO' to both behavioral questions on intake.  No further screening warranted    Preferred Language for Healthcare:   [x] English       [] other:    SUBJECTIVE EXAMINATION     Patient stated complaint: Pt reports his neck and arm pain are back to fairly severe.      Test used Initial score  2024   Pain Summary VAS 1-9 7   Functional questionnaire LEFS 28/55    Other:                Exercises/Interventions     Therapeutic Ex (08720)  resistance Sets/time Reps Notes/Cues/Progressions   Scap pinches  2 10    Wall slides  2 10    Chin sweep   2 10    Chin tuck  2 10    Open books  1 10    Pec S  10'' 5

## 2024-06-10 ENCOUNTER — HOSPITAL ENCOUNTER (OUTPATIENT)
Dept: PHYSICAL THERAPY | Age: 54
Setting detail: THERAPIES SERIES
Discharge: HOME | End: 2024-06-10
Payer: COMMERCIAL

## 2024-06-10 PROCEDURE — 97110 THERAPEUTIC EXERCISES: CPT

## 2024-06-10 PROCEDURE — 97140 MANUAL THERAPY 1/> REGIONS: CPT

## 2024-06-10 PROCEDURE — 20560 NDL INSJ W/O NJX 1 OR 2 MUSC: CPT

## 2024-06-10 NOTE — FLOWSHEET NOTE
Stimulation, Thermal Agents, and Vasoneumatic Compression  Patient education on joint protection, postural re-education, activity modification, and progression of HEP    Plan: POC initiated as per evaluation    Electronically Signed by Koby Andrews PT  Date: 06/10/2024     Note: Portions of this note have been templated and/or copied from initial evaluation, reassessments and prior notes for documentation efficiency.    Note: If patient does not return for scheduled/recommended follow up visits, this note will serve as a discharge from care along with the most recent update on progress.    Ortho Evaluation

## 2024-06-11 ENCOUNTER — HOSPITAL ENCOUNTER (OUTPATIENT)
Dept: PHYSICAL THERAPY | Age: 54
Setting detail: THERAPIES SERIES
Discharge: HOME | End: 2024-06-11
Payer: COMMERCIAL

## 2024-06-11 PROCEDURE — 97110 THERAPEUTIC EXERCISES: CPT

## 2024-06-11 PROCEDURE — 97140 MANUAL THERAPY 1/> REGIONS: CPT

## 2024-06-11 PROCEDURE — 20560 NDL INSJ W/O NJX 1 OR 2 MUSC: CPT

## 2024-06-11 NOTE — FLOWSHEET NOTE
[facility]- Outpatient Rehabilitation and Therapy [address], [place] [address] office: [phone] fax: [phone]           Physical Therapy: TREATMENT/PROGRESS NOTE   Patient: Trent Barrera (53 y.o. male)   Examination Date: 2024   :  1970 MRN: [id number]   Visit #: 5   Insurance Allowable Auth Needed   12 BWC [x]Yes    []No    Insurance: Payor: [employer] / Plan: [employer] / Product Type: *No Product type* /   Insurance ID: [id number] - (Worker's Comp)  Secondary Insurance (if applicable):    Treatment Diagnosis: L shoulder pain  No diagnosis found.   Medical Diagnosis:  No admission diagnoses are documented for this encounter.   Referring Physician: Gume Sanchez MD  PCP: Onur Kong DO     Plan of care signed (Y/N):     Date of Patient follow up with Physician:      Progress Report/POC: YES, Date Range for this report: 24 to 24  POC update due: (10 visits /OR AUTH LIMITS, whichever is less)  2024                                             Precautions/ Contra-indications:           Latex allergy:  NO  Pacemaker:    NO  Contraindications for Manipulation: None  Date of Surgery: na  Other:    Red Flags:  Chest pain    C-SSRS Triggered by Intake questionnaire:   Patient answered 'NO' to both behavioral questions on intake.  No further screening warranted    Preferred Language for Healthcare:   [x] English       [] other:    SUBJECTIVE EXAMINATION     Patient stated complaint: Pt reports his pain is significantly worse today.      Test used Initial score  2024   Pain Summary VAS 1-9 9   Functional questionnaire LEFS     Other:                Exercises/Interventions     Therapeutic Ex (66110)  resistance Sets/time Reps Notes/Cues/Progressions   Scap pinches  2 10    Wall slides  2 10    Chin sweep   2 10    Chin tuck  2 10    Open books  1 10    Pec S  10'' 5    Median nerve stretch  2 10

## 2024-06-12 ENCOUNTER — OFFICE VISIT (OUTPATIENT)
Dept: ORTHOPEDIC SURGERY | Age: 54
End: 2024-06-12
Payer: COMMERCIAL

## 2024-06-12 VITALS — BODY MASS INDEX: 32.14 KG/M2 | RESPIRATION RATE: 16 BRPM | HEIGHT: 69 IN | WEIGHT: 217 LBS

## 2024-06-12 DIAGNOSIS — S16.1XXA STRAIN OF NECK MUSCLE, INITIAL ENCOUNTER: Primary | ICD-10-CM

## 2024-06-12 PROCEDURE — 99213 OFFICE O/P EST LOW 20 MIN: CPT | Performed by: STUDENT IN AN ORGANIZED HEALTH CARE EDUCATION/TRAINING PROGRAM

## 2024-06-12 NOTE — PROGRESS NOTES
New Patient: CERVICAL SPINE    Referring Provider:  No ref. provider found    CHIEF COMPLAINT:    Chief Complaint   Patient presents with    Neck Pain       HISTORY OF PRESENT ILLNESS:    Mr. Trent Barrera is a pleasant 53 y.o. old male here for consultation regarding his neck and left arm pain. He states the pain began after a work injury on 4/26/24 where he was cutting a copper cable and it fell. When he grabbed it it jerked his arm and his neck. Since then he has been experiencing left sided neck and shoulder pain which has progressed to radicular symptoms down his arm and to his chest and upper back.  His pain has steadily worsened since then. He rates his neck pain 7/10 and shoulder and arm pain 7/10. He describes the pain as burning, aching. Pain is worse with cervical extension and rotation to the left and slightly better with cervical flexion and rotation to the right . The arm pain radiates to his left triceps, medial border of the left scapula, left axilla and left chest. He reports numbness and tingling in a non specific distribution. He reports some weakness of his left arm. Patient reports mild difficulty with fine motor skills. He denies lower extremity symptoms, gait abnormality and bowel or bladder dysfunction. The pain interferes with his sleep. He started gabapentin two weeks ago and it has been helping with his sleep. He has been in PT and they have done traction and dry needling that provide temporary relief. He has also tried an oral steroid with no relief.     Current/Past Treatment:   Physical Therapy: none  Chiropractic:  none   Injection:  none   Medications:    NSAIDS:  OTC  Muscle relaxer:  NONE  Steriods:   medrol dose pack   Neuropathic medications:   gabapentin 300 mg   Opioids: NONE  Other: NONE   Surgery/Consult NONE    Past Medical History:   Past Medical History:   Diagnosis Date    Anxiety     Chronic low back pain     level 5    Degenerative disc disease, lumbar 4/9/2015

## 2024-06-13 ENCOUNTER — TELEPHONE (OUTPATIENT)
Dept: ORTHOPEDIC SURGERY | Age: 54
End: 2024-06-13

## 2024-06-13 NOTE — TELEPHONE ENCOUNTER
Following the patient's visit on 6/12/2024, Dr. Sanchez wishes to request the following for the patient:   Claim addition:  Cervical spine - S16.1XXA cervical strain  Trent has contacted his  who suggested he ask our office to submit for the CSP to be added to his claim. Does not appear this was on his initial intake.   MRI CSP - order in chart     Please advise clinical staff if further action is needed and/or when approval is received.

## 2024-06-17 ENCOUNTER — APPOINTMENT (OUTPATIENT)
Dept: PHYSICAL THERAPY | Age: 54
End: 2024-06-17
Payer: COMMERCIAL

## 2024-06-20 ENCOUNTER — APPOINTMENT (OUTPATIENT)
Dept: PHYSICAL THERAPY | Age: 54
End: 2024-06-20
Payer: COMMERCIAL

## 2024-06-30 DIAGNOSIS — F41.9 ANXIETY: ICD-10-CM

## 2024-06-30 DIAGNOSIS — S43.402A SPRAIN OF LEFT SHOULDER, UNSPECIFIED SHOULDER SPRAIN TYPE, INITIAL ENCOUNTER: ICD-10-CM

## 2024-06-30 DIAGNOSIS — E78.2 MIXED HYPERLIPIDEMIA: ICD-10-CM

## 2024-07-01 DIAGNOSIS — E78.2 MIXED HYPERLIPIDEMIA: ICD-10-CM

## 2024-07-01 DIAGNOSIS — F41.9 ANXIETY: ICD-10-CM

## 2024-07-01 RX ORDER — ATORVASTATIN CALCIUM 40 MG/1
40 TABLET, FILM COATED ORAL DAILY
Qty: 90 TABLET | Refills: 3 | Status: SHIPPED | OUTPATIENT
Start: 2024-07-01 | End: 2024-07-01 | Stop reason: SDUPTHER

## 2024-07-01 RX ORDER — SERTRALINE HYDROCHLORIDE 100 MG/1
100 TABLET, FILM COATED ORAL EVERY MORNING
Qty: 90 TABLET | Refills: 1 | Status: SHIPPED | OUTPATIENT
Start: 2024-07-01 | End: 2024-07-01 | Stop reason: SDUPTHER

## 2024-07-01 RX ORDER — GABAPENTIN 300 MG/1
300 CAPSULE ORAL NIGHTLY
Qty: 30 CAPSULE | Refills: 0 | Status: SHIPPED | OUTPATIENT
Start: 2024-07-01 | End: 2024-07-31

## 2024-07-01 RX ORDER — ATORVASTATIN CALCIUM 40 MG/1
40 TABLET, FILM COATED ORAL DAILY
Qty: 90 TABLET | Refills: 3 | Status: SHIPPED | OUTPATIENT
Start: 2024-07-01

## 2024-07-01 RX ORDER — SERTRALINE HYDROCHLORIDE 100 MG/1
100 TABLET, FILM COATED ORAL EVERY MORNING
Qty: 90 TABLET | Refills: 3 | Status: SHIPPED | OUTPATIENT
Start: 2024-07-01

## 2024-07-01 NOTE — TELEPHONE ENCOUNTER
Refill Request     CONFIRM preferred pharmacy with the patient.    If Mail Order Rx - Pend for 90 day refill.      Last Seen: Last Seen Department: 3/19/2024  Last Seen by PCP: 3/19/2024    Last Written:   Atorvastatin 7/1/24 #90 refills 3    Zoloft 7/1/24 #90 refills 1    If no future appointment scheduled:  Review the last OV with PCP and review information for follow-up visit,  Route STAFF MESSAGE with patient name to the  Pool for scheduling with the following information:            -  Timing of next visit           -  Visit type ie Physical, OV, etc           -  Diagnoses/Reason ie. COPD, HTN - Do not use MEDICATION, Follow-up or CHECK UP - Give reason for visit      Next Appointment:   Future Appointments   Date Time Provider Department Center   7/17/2024  1:45 PM Gume Sanchez MD [facility]   9/19/2024  3:30 PM Onur Kong DO [facility]  [place] [facility]       Message sent to  to schedule appt with patient?  NO      Requested Prescriptions     Pending Prescriptions Disp Refills    sertraline (ZOLOFT) 100 MG tablet 90 tablet 1     Sig: Take 1 tablet by mouth every morning    atorvastatin (LIPITOR) 40 MG tablet 90 tablet 3     Sig: Take 1 tablet by mouth daily

## 2024-07-01 NOTE — TELEPHONE ENCOUNTER
Refill Request     CONFIRM preferred pharmacy with the patient.    If Mail Order Rx - Pend for 90 day refill.      Last Seen: Last Seen Department: 3/19/2024    Last Seen by PCP: 3/19/2024    Last Written: 4/1/24 180 tablet 0 refills    If no future appointment scheduled:  Review the last OV with PCP and review information for follow-up visit,  Route STAFF MESSAGE with patient name to the  Pool for scheduling with the following information:            -  Timing of next visit           -  Visit type ie Physical, OV, etc           -  Diagnoses/Reason ie. COPD, HTN - Do not use MEDICATION, Follow-up or CHECK UP - Give reason for visit      Next Appointment: 9/19/24  Future Appointments   Date Time Provider Department Center   7/17/2024  1:45 PM Gume Sanchez MD [facility]   9/19/2024  3:30 PM [facility]  [place] [facility]       Message sent to  to schedule appt with patient?  NO      Requested Prescriptions     Pending Prescriptions Disp Refills    clonazePAM (KLONOPIN) 0.5 MG tablet [Pharmacy Med Name: clonazePAM Oral Tablet 0.5 MG] 180 tablet 0     Sig: TAKE 1 TABLET BY MOUTH 2 TIMES A DAY AS NEEDED FOR ANXIETY

## 2024-07-01 NOTE — TELEPHONE ENCOUNTER
Refill Request     CONFIRM preferred pharmacy with the patient.    If Mail Order Rx - Pend for 90 day refill.      Last Seen: Last Seen Department: 3/19/2024  Last Seen by PCP: 3/19/2024    Last Written: 3/4/24 6 tab 0 refills    If no future appointment scheduled:  Review the last OV with PCP and review information for follow-up visit,  Route STAFF MESSAGE with patient name to the  Pool for scheduling with the following information:            -  Timing of next visit           -  Visit type ie Physical, OV, etc           -  Diagnoses/Reason ie. COPD, HTN - Do not use MEDICATION, Follow-up or CHECK UP - Give reason for visit      Next Appointment:   Future Appointments   Date Time Provider Department Center   7/17/2024  1:45 PM Gume Sanchez MD [facility]   9/19/2024  3:30 PM Onur Kong DO [facility]  [place] [facility]       Message sent to  to schedule appt with patient?  NO      Requested Prescriptions     Pending Prescriptions Disp Refills    atorvastatin (LIPITOR) 40 MG tablet 6 tablet 0     Sig: Take 1 tablet by mouth daily

## 2024-07-02 RX ORDER — CLONAZEPAM 0.5 MG/1
TABLET ORAL
Qty: 180 TABLET | Refills: 0 | Status: SHIPPED | OUTPATIENT
Start: 2024-07-02 | End: 2024-09-30

## 2024-07-17 ENCOUNTER — OFFICE VISIT (OUTPATIENT)
Dept: ORTHOPEDIC SURGERY | Age: 54
End: 2024-07-17

## 2024-07-17 ENCOUNTER — TELEPHONE (OUTPATIENT)
Dept: ORTHOPEDIC SURGERY | Age: 54
End: 2024-07-17

## 2024-07-17 VITALS — WEIGHT: 221 LBS | BODY MASS INDEX: 32.73 KG/M2 | HEIGHT: 69 IN | RESPIRATION RATE: 16 BRPM

## 2024-07-17 DIAGNOSIS — S43.402A SPRAIN OF LEFT SHOULDER, UNSPECIFIED SHOULDER SPRAIN TYPE, INITIAL ENCOUNTER: Primary | ICD-10-CM

## 2024-07-17 NOTE — PROGRESS NOTES
CHIEF COMPLAINT: Left shoulder pain    DATE OF INJURY: 4/26/24    History:    Trent Barrera is a 53 y.o. right handed male self-referred for evaluation and treatment of Left shoulder pain.   This is evaluated as a workers compensation injury.   The pain began 5 days ago.  Pain is rated as a 4-10/10.   There was an injury.  He was cutting copper cable and jerked his arm/shoulder.  Pain is located laterally and in his axilla.  Symptoms are worse with movement of the shoulder and neck.  He does note numbness and tingling in all his fingers.  He does complain of pain shooting down his arm.  He does have a history of 2 prior left shoulder surgeries, 1 involving his rotator cuff.  He does not remember who the surgeon was.  He states that was 10-15 years ago.  The patient has not had PT. The patient has not had an injection.   The patient has tried NSAIDs, without relief. The patient has tried ice, without relief.   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    Interval History: His shoulder feels the same.  He rates pain 4-8/10.  He states the Medrol did not provide any significant relief.  Injection did not help.  PT has not helped.  Has been to 5 sessions.  He stopped going a few weeks ago.  Today he states most of his pain is actually pectoral but also deep within the shoulder.             Past Medical History:   Diagnosis Date    Anxiety     Chronic low back pain     level 5    Degenerative disc disease, lumbar 4/9/2015    Hyperlipidemia     Hypertension     Moderate episode of recurrent major depressive disorder (HCC) 4/3/2019    Sleep apnea     uses c-pap       Past Surgical History:   Procedure Laterality Date    BACK SURGERY      \"nerves burnt in back\"    COLONOSCOPY N/A 2/4/2020    COLONOSCOPY POLYPECTOMY SNARE/COLD BIOPSY performed by Edenilson Anthony MD at [facility] ENDOSCOPY    COLONOSCOPY N/A 2/4/2020    COLONOSCOPY CONTROL HEMORRHAGE

## 2024-07-25 ENCOUNTER — TELEPHONE (OUTPATIENT)
Dept: ORTHOPEDIC SURGERY | Age: 54
End: 2024-07-25

## 2024-07-25 NOTE — TELEPHONE ENCOUNTER
L/M FOR Trent Barrera   MRA for LEFT SHOULDER has been approved by [facility] with the following details:   Expires 8/25/2024  [facility] should reach out to him to schedule his MRI. He was asked to inform our office where and when the MRI is when he calls to schedule his follow up appointment.     A follow up appointment will need to be scheduled to review the results and treatment plan.     Suggested he contact his  if he has not heard from [facility] by noon tomorrow. Patient was instructed to contact the office with further questions or concerns.       [employer] message sent to Trent Barrera with noted information.

## 2024-07-29 ENCOUNTER — TELEPHONE (OUTPATIENT)
Dept: ORTHOPEDIC SURGERY | Age: 54
End: 2024-07-29

## 2024-07-29 DIAGNOSIS — S43.402A SPRAIN OF LEFT SHOULDER, UNSPECIFIED SHOULDER SPRAIN TYPE, INITIAL ENCOUNTER: Primary | ICD-10-CM

## 2024-07-29 NOTE — TELEPHONE ENCOUNTER
General Question     Subject: MRI   Patient and /or Facility Request: Trent Barrera   Contact Number: [phone]     PAUL WITH [facility] CALLED REGARDING THE PATIENT MRI     THE PATIENT STATED HE IS CLAUSTROPHOBIC AND HE DOES NOT BELIEVE MEDICATION WILL HELP WITH THIS    HE IS WANTING TO KNOW IF HE CAN GET HIS MRI DONE A CONSCIOUS SEDATION AT [facility] OR WHAT HIS NEXT STEPS WOULD BE    PLEASE CALL PATIENT BACK AT THE ABOVE NUMBER

## 2024-07-30 NOTE — TELEPHONE ENCOUNTER
Verified with [facility]. They do perform testing under conscious sedation. If patient wants general anesthesia, he will need to go to [facility].    Spoke with patient. He would like to try conscious sedation at [facility]. Advised that we would submit to worker's compensation and let him know when we hear back.

## 2024-08-01 ENCOUNTER — TELEPHONE (OUTPATIENT)
Dept: ORTHOPEDIC SURGERY | Age: 54
End: 2024-08-01

## 2024-08-01 NOTE — TELEPHONE ENCOUNTER
C9 received for MRI to be under conscious sedation.    Spoke with patient. He is advised to call scheduling at [phone]. Asked him to make sure to verify if he needs H&P for the conscious sedation. Follow up in office a minimum of three business days after the MRI to obtain results and treatment options.    Patient verbalized understanding and agreed.

## 2024-08-02 DIAGNOSIS — S43.402A SPRAIN OF LEFT SHOULDER, UNSPECIFIED SHOULDER SPRAIN TYPE, INITIAL ENCOUNTER: ICD-10-CM

## 2024-08-02 RX ORDER — GABAPENTIN 300 MG/1
300 CAPSULE ORAL NIGHTLY
Qty: 30 CAPSULE | Refills: 0 | Status: SHIPPED | OUTPATIENT
Start: 2024-08-02 | End: 2024-09-01

## 2024-08-09 ENCOUNTER — HOSPITAL ENCOUNTER (OUTPATIENT)
Dept: MRI IMAGING | Age: 54
Discharge: HOME | End: 2024-08-09
Attending: ORTHOPAEDIC SURGERY
Payer: COMMERCIAL

## 2024-08-09 ENCOUNTER — HOSPITAL ENCOUNTER (OUTPATIENT)
Dept: INTERVENTIONAL RADIOLOGY/VASCULAR | Age: 54
Discharge: HOME | End: 2024-08-09
Attending: ORTHOPAEDIC SURGERY
Payer: COMMERCIAL

## 2024-08-09 ENCOUNTER — ANESTHESIA EVENT (OUTPATIENT)
Dept: MRI IMAGING | Age: 54
End: 2024-08-09
Payer: COMMERCIAL

## 2024-08-09 ENCOUNTER — ANESTHESIA (OUTPATIENT)
Dept: MRI IMAGING | Age: 54
End: 2024-08-09
Payer: COMMERCIAL

## 2024-08-09 VITALS
OXYGEN SATURATION: 98 % | RESPIRATION RATE: 16 BRPM | TEMPERATURE: 98.8 F | DIASTOLIC BLOOD PRESSURE: 97 MMHG | SYSTOLIC BLOOD PRESSURE: 122 MMHG | HEART RATE: 58 BPM

## 2024-08-09 DIAGNOSIS — S43.402A SPRAIN OF LEFT SHOULDER, UNSPECIFIED SHOULDER SPRAIN TYPE, INITIAL ENCOUNTER: ICD-10-CM

## 2024-08-09 PROCEDURE — 77002 NEEDLE LOCALIZATION BY XRAY: CPT

## 2024-08-09 PROCEDURE — 73222 MRI JOINT UPR EXTREM W/DYE: CPT

## 2024-08-09 PROCEDURE — 23350 INJECTION FOR SHOULDER X-RAY: CPT

## 2024-08-09 PROCEDURE — A9579 GAD-BASE MR CONTRAST NOS,1ML: HCPCS | Performed by: ORTHOPAEDIC SURGERY

## 2024-08-09 PROCEDURE — 2500000003 HC RX 250 WO HCPCS

## 2024-08-09 PROCEDURE — 6360000002 HC RX W HCPCS

## 2024-08-09 PROCEDURE — 6360000004 HC RX CONTRAST MEDICATION: Performed by: ORTHOPAEDIC SURGERY

## 2024-08-09 PROCEDURE — 2580000003 HC RX 258

## 2024-08-09 RX ORDER — DIPHENHYDRAMINE HYDROCHLORIDE 50 MG/ML
12.5 INJECTION INTRAMUSCULAR; INTRAVENOUS
OUTPATIENT
Start: 2024-08-09 | End: 2024-08-10

## 2024-08-09 RX ORDER — SODIUM CHLORIDE 0.9 % (FLUSH) 0.9 %
5-40 SYRINGE (ML) INJECTION PRN
OUTPATIENT
Start: 2024-08-09

## 2024-08-09 RX ORDER — SODIUM CHLORIDE, SODIUM LACTATE, POTASSIUM CHLORIDE, CALCIUM CHLORIDE 600; 310; 30; 20 MG/100ML; MG/100ML; MG/100ML; MG/100ML
INJECTION, SOLUTION INTRAVENOUS CONTINUOUS PRN
Status: DISCONTINUED | OUTPATIENT
Start: 2024-08-09 | End: 2024-08-09 | Stop reason: SDUPTHER

## 2024-08-09 RX ORDER — OXYCODONE HYDROCHLORIDE 5 MG/1
5 TABLET ORAL
OUTPATIENT
Start: 2024-08-09 | End: 2024-08-10

## 2024-08-09 RX ORDER — SODIUM CHLORIDE 0.9 % (FLUSH) 0.9 %
5-40 SYRINGE (ML) INJECTION EVERY 12 HOURS SCHEDULED
OUTPATIENT
Start: 2024-08-09

## 2024-08-09 RX ORDER — LORAZEPAM 2 MG/ML
0.5 INJECTION INTRAMUSCULAR
OUTPATIENT
Start: 2024-08-09 | End: 2024-08-10

## 2024-08-09 RX ORDER — SODIUM CHLORIDE 9 MG/ML
INJECTION, SOLUTION INTRAVENOUS PRN
OUTPATIENT
Start: 2024-08-09

## 2024-08-09 RX ORDER — ONDANSETRON 2 MG/ML
4 INJECTION INTRAMUSCULAR; INTRAVENOUS
OUTPATIENT
Start: 2024-08-09 | End: 2024-08-10

## 2024-08-09 RX ORDER — LABETALOL HYDROCHLORIDE 5 MG/ML
10 INJECTION, SOLUTION INTRAVENOUS
OUTPATIENT
Start: 2024-08-09

## 2024-08-09 RX ORDER — MIDAZOLAM HYDROCHLORIDE 1 MG/ML
INJECTION INTRAMUSCULAR; INTRAVENOUS PRN
Status: DISCONTINUED | OUTPATIENT
Start: 2024-08-09 | End: 2024-08-09 | Stop reason: SDUPTHER

## 2024-08-09 RX ORDER — PROCHLORPERAZINE EDISYLATE 5 MG/ML
5 INJECTION INTRAMUSCULAR; INTRAVENOUS
OUTPATIENT
Start: 2024-08-09 | End: 2024-08-10

## 2024-08-09 RX ORDER — NALOXONE HYDROCHLORIDE 0.4 MG/ML
INJECTION, SOLUTION INTRAMUSCULAR; INTRAVENOUS; SUBCUTANEOUS PRN
OUTPATIENT
Start: 2024-08-09

## 2024-08-09 RX ORDER — MEPERIDINE HYDROCHLORIDE 50 MG/ML
12.5 INJECTION INTRAMUSCULAR; INTRAVENOUS; SUBCUTANEOUS EVERY 5 MIN PRN
OUTPATIENT
Start: 2024-08-09

## 2024-08-09 RX ADMIN — MIDAZOLAM 2 MG: 1 INJECTION INTRAMUSCULAR; INTRAVENOUS at 14:05

## 2024-08-09 RX ADMIN — GADOTERIDOL 0.1 ML: 279.3 INJECTION, SOLUTION INTRAVENOUS at 14:05

## 2024-08-09 RX ADMIN — IOVERSOL 15 ML: 741 INJECTION INTRA-ARTERIAL; INTRAVENOUS at 14:05

## 2024-08-09 RX ADMIN — SODIUM CHLORIDE, SODIUM LACTATE, POTASSIUM CHLORIDE, AND CALCIUM CHLORIDE: .6; .31; .03; .02 INJECTION, SOLUTION INTRAVENOUS at 13:58

## 2024-08-09 RX ADMIN — DEXMEDETOMIDINE HYDROCHLORIDE 4 MCG: 100 INJECTION, SOLUTION INTRAVENOUS at 14:05

## 2024-08-09 RX ADMIN — Medication 20 MG: at 14:10

## 2024-08-09 RX ADMIN — DEXMEDETOMIDINE HYDROCHLORIDE 4 MCG: 100 INJECTION, SOLUTION INTRAVENOUS at 14:08

## 2024-08-09 RX ADMIN — DEXMEDETOMIDINE HYDROCHLORIDE 8 MCG: 100 INJECTION, SOLUTION INTRAVENOUS at 14:02

## 2024-08-09 NOTE — ANESTHESIA POSTPROCEDURE EVALUATION
Department of Anesthesiology  Postprocedure Note    Patient: Trent Barrera  MRN: [id number]  YOB: 1970  Date of evaluation: 8/9/2024    Procedure Summary       Date: 08/09/24 Room / Location: [facility]    Anesthesia Start: 1358 Anesthesia Stop: 1443    Procedure: MRI SHOULDER LEFT W CONTRAST Diagnosis:       Sprain of left shoulder, unspecified shoulder sprain type, initial encounter      (MRA- evaluate labrum)    Scheduled Providers:  Responsible Provider: Chidi Kidd MD    Anesthesia Type: MAC ASA Status: 2            Anesthesia Type: No value filed.    Stephan Phase I:      Stephan Phase II:      Anesthesia Post Evaluation    Patient location during evaluation: PACU  Patient participation: complete - patient participated  Level of consciousness: awake and alert  Airway patency: patent  Nausea & Vomiting: no vomiting and no nausea  Cardiovascular status: hemodynamically stable and blood pressure returned to baseline  Respiratory status: acceptable  Hydration status: euvolemic  Comments: ---------------------------               08/09/24                      1309         ---------------------------   BP:          134/89         Pulse:         68           Resp:          16           Temp:   97.6 °F (36.4 °C)   SpO2:          96%         ---------------------------    Pain management: adequate    No notable events documented.

## 2024-08-09 NOTE — PROGRESS NOTES
Patient meets criteria for discharge per policy. Discharge instructions given to patient and girlfriend, verbalized understanding. PIV removed. Patient discharged in stable condition with girlfriend.

## 2024-08-09 NOTE — ANESTHESIA PRE PROCEDURE
Department of Anesthesiology  Preprocedure Note       Name:  Trent Barrera   Age:  53 y.o.  :  1970                                          MRN:  [id number]         Date:  2024      Surgeon: * No surgeons listed *    Procedure: * No procedures listed *    Medications prior to admission:   Prior to Admission medications    Medication Sig Start Date End Date Taking? Authorizing Provider   gabapentin (NEURONTIN) 300 MG capsule Take 1 capsule by mouth nightly for 30 days. Intended supply: 30 days 24  Bhargavi Starr PA   clonazePAM (KLONOPIN) 0.5 MG tablet TAKE 1 TABLET BY MOUTH 2 TIMES A DAY AS NEEDED FOR ANXIETY 24  Mirta Bustamante APRN - CNP   sertraline (ZOLOFT) 100 MG tablet Take 1 tablet by mouth every morning 24   Onur Kong, DO   atorvastatin (LIPITOR) 40 MG tablet Take 1 tablet by mouth daily 24   Onur Kong, DO   therapeutic multivitamin-minerals (THERAGRAN-M) tablet Take 1 tablet by mouth daily Indications: OTC    Provider, MD Rodrigo       Current medications:    Current Outpatient Medications   Medication Sig Dispense Refill   • gabapentin (NEURONTIN) 300 MG capsule Take 1 capsule by mouth nightly for 30 days. Intended supply: 30 days 30 capsule 0   • clonazePAM (KLONOPIN) 0.5 MG tablet TAKE 1 TABLET BY MOUTH 2 TIMES A DAY AS NEEDED FOR ANXIETY 180 tablet 0   • sertraline (ZOLOFT) 100 MG tablet Take 1 tablet by mouth every morning 90 tablet 3   • atorvastatin (LIPITOR) 40 MG tablet Take 1 tablet by mouth daily 90 tablet 3   • therapeutic multivitamin-minerals (THERAGRAN-M) tablet Take 1 tablet by mouth daily Indications: OTC       No current facility-administered medications for this encounter.       Allergies:    Allergies   Allergen Reactions   • Lisinopril    • Penicillins      Child reaction       Problem List:    Patient Active Problem List   Diagnosis Code   • Anxiety F41.9   • Panic attacks F41.0   • Vitamin D deficiency E55.9   •

## 2024-08-09 NOTE — PROGRESS NOTES
Patient arrived to PACU bay 6, phase two initiated. Placed on bedside monitor, VSS. Report obtained from OR RN and anesthesia.Side rails in place.

## 2024-08-12 ENCOUNTER — TELEPHONE (OUTPATIENT)
Dept: INTERVENTIONAL RADIOLOGY/VASCULAR | Age: 54
End: 2024-08-12

## 2024-08-12 NOTE — TELEPHONE ENCOUNTER
Attempted to call patient for post procedure follow up. No answer, left message for patient to call back at [phone].     Number used [phone]  Procedure:  Shoulder Arthrogram

## 2024-08-16 ENCOUNTER — PATIENT MESSAGE (OUTPATIENT)
Dept: ORTHOPEDIC SURGERY | Age: 54
End: 2024-08-16

## 2024-08-16 DIAGNOSIS — M54.12 CERVICAL RADICULOPATHY: Primary | ICD-10-CM

## 2024-08-21 ENCOUNTER — OFFICE VISIT (OUTPATIENT)
Dept: ORTHOPEDIC SURGERY | Age: 54
End: 2024-08-21
Payer: COMMERCIAL

## 2024-08-21 VITALS — HEIGHT: 69 IN | RESPIRATION RATE: 16 BRPM | BODY MASS INDEX: 33.18 KG/M2 | WEIGHT: 224 LBS

## 2024-08-21 DIAGNOSIS — S43.402A SPRAIN OF LEFT SHOULDER, UNSPECIFIED SHOULDER SPRAIN TYPE, INITIAL ENCOUNTER: Primary | ICD-10-CM

## 2024-08-21 PROCEDURE — 99213 OFFICE O/P EST LOW 20 MIN: CPT | Performed by: ORTHOPAEDIC SURGERY

## 2024-08-21 NOTE — PROGRESS NOTES
CHIEF COMPLAINT: Left shoulder pain    DATE OF INJURY: 4/26/24    History:    Trent Barrera is a 53 y.o. right handed male self-referred for evaluation and treatment of Left shoulder pain.   This is evaluated as a workers compensation injury.   The pain began 5 days ago.  Pain is rated as a 4-10/10.   There was an injury.  He was cutting copper cable and jerked his arm/shoulder.  Pain is located laterally and in his axilla.  Symptoms are worse with movement of the shoulder and neck.  He does note numbness and tingling in all his fingers.  He does complain of pain shooting down his arm.  He does have a history of 2 prior left shoulder surgeries, 1 involving his rotator cuff.  He does not remember who the surgeon was.  He states that was 10-15 years ago.  The patient has not had PT. The patient has not had an injection.   The patient has tried NSAIDs, without relief. The patient has tried ice, without relief.   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    Interval History: His shoulder feels the same.  He rates pain 4-8/10.  He complains of pectoral pain.  He also notes neck pain.  He has pain along his arm and describes radicular pain and numbness along his fifth finger.  He states that he will just end up paying for the cervical spine MRI himself and is scheduled for it this weekend.             Past Medical History:   Diagnosis Date    Anxiety     Chronic low back pain     level 5    Degenerative disc disease, lumbar 4/9/2015    Hyperlipidemia     Hypertension     Moderate episode of recurrent major depressive disorder (HCC) 4/3/2019    Sleep apnea     uses c-pap       Past Surgical History:   Procedure Laterality Date    BACK SURGERY      \"nerves burnt in back\"    COLONOSCOPY N/A 2/4/2020    COLONOSCOPY POLYPECTOMY SNARE/COLD BIOPSY performed by Edenilson Anthony MD at [facility] ENDOSCOPY    COLONOSCOPY N/A 2/4/2020    COLONOSCOPY CONTROL

## 2024-08-28 ENCOUNTER — OFFICE VISIT (OUTPATIENT)
Dept: ORTHOPEDIC SURGERY | Age: 54
End: 2024-08-28
Payer: COMMERCIAL

## 2024-08-28 VITALS — BODY MASS INDEX: 33.18 KG/M2 | WEIGHT: 224 LBS | HEIGHT: 69 IN

## 2024-08-28 DIAGNOSIS — M54.12 CERVICAL RADICULOPATHY: Primary | ICD-10-CM

## 2024-08-28 PROCEDURE — 99214 OFFICE O/P EST MOD 30 MIN: CPT | Performed by: STUDENT IN AN ORGANIZED HEALTH CARE EDUCATION/TRAINING PROGRAM

## 2024-08-28 RX ORDER — GABAPENTIN 300 MG/1
300 CAPSULE ORAL 2 TIMES DAILY
Qty: 60 CAPSULE | Refills: 0 | Status: SHIPPED | OUTPATIENT
Start: 2024-08-28 | End: 2024-09-27

## 2024-08-28 NOTE — PROGRESS NOTES
New Patient: CERVICAL SPINE    Referring Provider:  No ref. provider found    CHIEF COMPLAINT:    Chief Complaint   Patient presents with    Follow-up     Cervical MRI results       HISTORY OF PRESENT ILLNESS:    Mr. Trent Barrera is a pleasant 53 y.o. old male here for consultation regarding his neck and left arm pain. He states the pain began after a work injury on 4/26/24 where he was cutting a copper cable and it fell. When he grabbed it it jerked his arm and his neck. Since then he has been experiencing left sided neck and shoulder pain which has progressed to radicular symptoms down his arm and to his chest and upper back.  His pain has steadily worsened since then. He rates his neck pain 7/10 and shoulder and arm pain 7/10. He describes the pain as burning, aching. Pain is worse with cervical extension and rotation to the left and slightly better with cervical flexion and rotation to the right . The arm pain radiates to his left triceps, medial border of the left scapula, left axilla and left chest. He reports numbness and tingling in a C7 distribution. He reports some weakness of his left arm. Patient reports mild difficulty with fine motor skills. He denies lower extremity symptoms, gait abnormality and bowel or bladder dysfunction. The pain interferes with his sleep. He started gabapentin two weeks ago and it has been helping with his sleep. He has been in PT and they have done traction and dry needling that provide temporary relief. He has also tried an oral steroid with no relief.     Interval history: The patient presents for cervical MRI follow up. He rates pain 4/10. He describes a constant burning along the C7 dermatomal pattern in the left arm. There is associated numbness and tingling at C7-8 dermatomal pattern. The gabapentin helps some at night but nothing helps during the day.       Current/Past Treatment:   Physical Therapy: none  Chiropractic:  none   Injection:  none   Medications:    NSAIDS:  injury.     4. Interventional:  We discussed pursuing a left C6/7 interlaminar epidural steroid injection to address the pain.  Radiologic imaging and symptoms confirm the pain etiology.  Risks, benefits and alternatives of interventional options were discussed.  These include and are not limited to bleeding, infection, spinal headache, nerve injury, increased pain and lack of pain relief.  The patient verbalized understanding and would like to proceed.  The patient will be scheduled accordingly.  Schedule with Dr. Ellison.     Follow up 2 weeks after injection.           ANN Ayala, PA-C  Board Certified by the National Committee on Certification of Physician Assistants  [facility]

## 2024-09-18 ENCOUNTER — TELEPHONE (OUTPATIENT)
Dept: ORTHOPEDIC SURGERY | Age: 54
End: 2024-09-18

## 2024-09-19 ENCOUNTER — TELEMEDICINE (OUTPATIENT)
Dept: FAMILY MEDICINE CLINIC | Age: 54
End: 2024-09-19
Payer: COMMERCIAL

## 2024-09-19 DIAGNOSIS — F41.9 ANXIETY: ICD-10-CM

## 2024-09-19 DIAGNOSIS — F33.1 MODERATE EPISODE OF RECURRENT MAJOR DEPRESSIVE DISORDER (HCC): Primary | ICD-10-CM

## 2024-09-19 DIAGNOSIS — R68.82 DECREASED LIBIDO: ICD-10-CM

## 2024-09-19 DIAGNOSIS — F41.1 GAD (GENERALIZED ANXIETY DISORDER): ICD-10-CM

## 2024-09-19 DIAGNOSIS — E78.2 MIXED HYPERLIPIDEMIA: ICD-10-CM

## 2024-09-19 PROCEDURE — 99214 OFFICE O/P EST MOD 30 MIN: CPT | Performed by: STUDENT IN AN ORGANIZED HEALTH CARE EDUCATION/TRAINING PROGRAM

## 2024-09-19 RX ORDER — CLONAZEPAM 0.5 MG/1
0.5 TABLET ORAL 2 TIMES DAILY
Qty: 180 TABLET | Refills: 0 | Status: SHIPPED | OUTPATIENT
Start: 2024-09-30 | End: 2024-12-29

## 2024-09-24 DIAGNOSIS — E78.2 MIXED HYPERLIPIDEMIA: ICD-10-CM

## 2024-09-24 DIAGNOSIS — F41.1 GAD (GENERALIZED ANXIETY DISORDER): ICD-10-CM

## 2024-09-24 DIAGNOSIS — R68.82 DECREASED LIBIDO: ICD-10-CM

## 2024-09-24 DIAGNOSIS — F33.1 MODERATE EPISODE OF RECURRENT MAJOR DEPRESSIVE DISORDER (HCC): ICD-10-CM

## 2024-09-24 LAB
ALBUMIN SERPL-MCNC: 4.5 G/DL (ref 3.4–5)
ALBUMIN/GLOB SERPL: 2 {RATIO} (ref 1.1–2.2)
ALP SERPL-CCNC: 56 U/L (ref 40–129)
ALT SERPL-CCNC: 26 U/L (ref 10–40)
ANION GAP SERPL CALCULATED.3IONS-SCNC: 11 MMOL/L (ref 3–16)
AST SERPL-CCNC: 20 U/L (ref 15–37)
BILIRUB SERPL-MCNC: 0.5 MG/DL (ref 0–1)
BUN SERPL-MCNC: 18 MG/DL (ref 7–20)
CALCIUM SERPL-MCNC: 9.9 MG/DL (ref 8.3–10.6)
CHLORIDE SERPL-SCNC: 101 MMOL/L (ref 99–110)
CHOLEST SERPL-MCNC: 200 MG/DL (ref 0–199)
CO2 SERPL-SCNC: 25 MMOL/L (ref 21–32)
CREAT SERPL-MCNC: 1.2 MG/DL (ref 0.9–1.3)
GFR SERPLBLD CREATININE-BSD FMLA CKD-EPI: 72 ML/MIN/{1.73_M2}
GLUCOSE SERPL-MCNC: 88 MG/DL (ref 70–99)
HDLC SERPL-MCNC: 57 MG/DL (ref 40–60)
LDL CHOLESTEROL: 109 MG/DL
POTASSIUM SERPL-SCNC: 4.7 MMOL/L (ref 3.5–5.1)
PROT SERPL-MCNC: 6.7 G/DL (ref 6.4–8.2)
SODIUM SERPL-SCNC: 137 MMOL/L (ref 136–145)
TRIGL SERPL-MCNC: 171 MG/DL (ref 0–150)
VLDLC SERPL CALC-MCNC: 34 MG/DL

## 2024-09-25 DIAGNOSIS — M54.12 CERVICAL RADICULOPATHY: ICD-10-CM

## 2024-09-25 RX ORDER — GABAPENTIN 300 MG/1
300 CAPSULE ORAL 2 TIMES DAILY
Qty: 60 CAPSULE | Refills: 0 | Status: SHIPPED | OUTPATIENT
Start: 2024-09-25 | End: 2024-10-25

## 2024-09-27 LAB
SHBG SERPL-SCNC: 32 NMOL/L (ref 19–76)
TESTOST FREE SERPL-MCNC: 91.3 PG/ML (ref 47–244)
TESTOST SERPL-MCNC: 436 NG/DL (ref 193–740)

## 2024-09-28 DIAGNOSIS — M54.12 CERVICAL RADICULOPATHY: ICD-10-CM

## 2024-09-30 RX ORDER — GABAPENTIN 300 MG/1
300 CAPSULE ORAL 2 TIMES DAILY
Qty: 60 CAPSULE | Refills: 0 | Status: SHIPPED | OUTPATIENT
Start: 2024-09-30 | End: 2024-10-30

## 2024-10-01 RX ORDER — ATORVASTATIN CALCIUM 80 MG/1
80 TABLET, FILM COATED ORAL DAILY
Qty: 90 TABLET | Refills: 1 | Status: SHIPPED | OUTPATIENT
Start: 2024-10-01

## 2024-10-09 ENCOUNTER — TELEPHONE (OUTPATIENT)
Dept: ORTHOPEDIC SURGERY | Age: 54
End: 2024-10-09

## 2024-10-09 ENCOUNTER — OFFICE VISIT (OUTPATIENT)
Dept: ORTHOPEDIC SURGERY | Age: 54
End: 2024-10-09

## 2024-10-09 VITALS — BODY MASS INDEX: 33.18 KG/M2 | HEIGHT: 69 IN | WEIGHT: 224 LBS

## 2024-10-09 DIAGNOSIS — M50.00 HNP (HERNIATED NUCLEUS PULPOSUS) WITH MYELOPATHY, CERVICAL: ICD-10-CM

## 2024-10-09 DIAGNOSIS — M54.12 CERVICAL RADICULOPATHY: Primary | ICD-10-CM

## 2024-10-09 NOTE — PROGRESS NOTES
New Patient: CERVICAL SPINE    Referring Provider:  No ref. provider found    CHIEF COMPLAINT:    Chief Complaint   Patient presents with    Follow-up     neck       HISTORY OF PRESENT ILLNESS:    Mr. Trent Barrera is a pleasant 53 y.o. old male here for consultation regarding his neck and left arm pain. He states the pain began after a work injury on 4/26/24 where he was cutting a copper cable and it fell. When he grabbed it it jerked his arm and his neck. Since then he has been experiencing left sided neck and shoulder pain which has progressed to radicular symptoms down his arm and to his chest and upper back.  His pain has steadily worsened since then. He rates his neck pain 7/10 and shoulder and arm pain 7/10. He describes the pain as burning, aching. Pain is worse with cervical extension and rotation to the left and slightly better with cervical flexion and rotation to the right . The arm pain radiates to his left triceps, medial border of the left scapula, left axilla and left chest. He reports numbness and tingling in a C7 distribution. He reports some weakness of his left arm. Patient reports mild difficulty with fine motor skills. He denies lower extremity symptoms, gait abnormality and bowel or bladder dysfunction. The pain interferes with his sleep. He started gabapentin two weeks ago and it has been helping with his sleep. He has been in PT and they have done traction and dry needling that provide temporary relief. He has also tried an oral steroid with no relief.     Interval history: The patient presents for follow up after C6/7 interlaminar epidural steroid injection. He reports 50-60% relief following the injection. The gabapentin has continued to help as well. He reports no side effects from the medication. He does still have left sided neck pain with radicular symptoms along the C7 distribution, wrose with lifting, pushing and pulling.       Current/Past Treatment:   Physical Therapy: has done PT

## 2024-10-09 NOTE — TELEPHONE ENCOUNTER
Please submit C9 request for     Additional DX per MRI findings:   M54.12 cervical radiculopathy   M50.00 HNP with myelopathy  left C6-7 Interlaminar POLA with Dr Jared Ellison

## 2024-10-15 ENCOUNTER — PATIENT MESSAGE (OUTPATIENT)
Dept: ORTHOPEDIC SURGERY | Age: 54
End: 2024-10-15

## 2024-10-21 NOTE — TELEPHONE ENCOUNTER
Patients Light Duty restrictions are about to . I see the last letter was through 2024. Can I re write the letter and if so how long would you like the new letter for?

## 2024-10-28 DIAGNOSIS — M54.12 CERVICAL RADICULOPATHY: ICD-10-CM

## 2024-10-29 RX ORDER — GABAPENTIN 300 MG/1
300 CAPSULE ORAL 2 TIMES DAILY
Qty: 60 CAPSULE | Refills: 0 | Status: SHIPPED | OUTPATIENT
Start: 2024-10-29 | End: 2024-11-28

## 2024-10-30 ENCOUNTER — TELEPHONE (OUTPATIENT)
Dept: ORTHOPEDIC SURGERY | Age: 54
End: 2024-10-30

## 2024-10-30 NOTE — TELEPHONE ENCOUNTER
NARRATIVE REQUEST:    ALEXIS CRENSHAW    CONTACT: MICEHLLE  PH: [phone]  FAX: [phone]    NARRATIVE REQUEST SCANNED.

## 2024-11-20 ENCOUNTER — TELEPHONE (OUTPATIENT)
Dept: ORTHOPEDIC SURGERY | Age: 54
End: 2024-11-20

## 2024-12-03 ENCOUNTER — PATIENT MESSAGE (OUTPATIENT)
Dept: ORTHOPEDIC SURGERY | Age: 54
End: 2024-12-03

## 2024-12-04 NOTE — TELEPHONE ENCOUNTER
Received message from patient re: hearing results. DX are now approved.    Please submit C9 request for referral to Dr Jared Ellison, in chart from 10/9 visit, using      M54.12 cervical radiculopathy   M50.00 HNP with myelopathy

## 2024-12-23 DIAGNOSIS — F41.9 ANXIETY: ICD-10-CM

## 2024-12-23 RX ORDER — CLONAZEPAM 0.5 MG/1
TABLET ORAL
Qty: 180 TABLET | Refills: 0 | Status: SHIPPED | OUTPATIENT
Start: 2024-12-29 | End: 2025-03-29

## 2024-12-23 NOTE — TELEPHONE ENCOUNTER
Refill Request - Controlled Substance    CONFIRM preferred pharmacy with the patient.    If Mail Order Rx - Pend for 90 day refill.        Last Seen Department: 9/19/2024  Last Seen by PCP: 9/19/2024    Last Written: 9/19/24 #180 - 0 refills     Last UDS: 3/19/24    Med Agreement Signed On: 2/16/22     If no future appointment scheduled:  Review the last OV with PCP and review information for follow-up visit,  Route STAFF MESSAGE with patient name to the  Pool for scheduling with the following information:            -  Timing of next visit           -  Visit type ie Physical, OV, etc           -  Diagnoses/Reason ie. COPD, HTN - Do not use MEDICATION, Follow-up or CHECK UP - Give reason for visit        Next Appointment:   No future appointments.    Message sent to  to schedule appt with patient?  NO No follow ups on file       Requested Prescriptions     Pending Prescriptions Disp Refills    clonazePAM (KLONOPIN) 0.5 MG tablet [Pharmacy Med Name: clonazePAM Oral Tablet 0.5 MG] 180 tablet 0     Sig: TAKE 1 TABLET BY MOUTH IN THE MORNING AND 1 TABLET BY MOUTH AT BEDTIME FOR 90 DAYS. MAX DAILY AMOUNT 2 TABLETS.

## 2025-01-03 DIAGNOSIS — M54.12 CERVICAL RADICULOPATHY: ICD-10-CM

## 2025-01-07 RX ORDER — GABAPENTIN 300 MG/1
300 CAPSULE ORAL 2 TIMES DAILY
Qty: 60 CAPSULE | Refills: 0 | Status: SHIPPED | OUTPATIENT
Start: 2025-01-07 | End: 2025-02-06

## 2025-01-14 ENCOUNTER — TELEPHONE (OUTPATIENT)
Dept: ORTHOPEDIC SURGERY | Age: 55
End: 2025-01-14

## 2025-01-14 NOTE — TELEPHONE ENCOUNTER
Per 12/3/2024 message from patient, his neck diagnoses were approved in hearing. He should be out of the appeal period as well.     Please request referral to Dr Jared Ellison using codes              M54.12 cervical radiculopathy              M50.00 HNP with myelopathy

## 2025-01-29 ENCOUNTER — TELEPHONE (OUTPATIENT)
Dept: ORTHOPEDIC SURGERY | Age: 55
End: 2025-01-29

## 2025-01-29 DIAGNOSIS — M54.12 CERVICAL RADICULOPATHY: Primary | ICD-10-CM

## 2025-01-29 DIAGNOSIS — S16.1XXA STRAIN OF NECK MUSCLE, INITIAL ENCOUNTER: ICD-10-CM

## 2025-01-29 DIAGNOSIS — M50.00 HNP (HERNIATED NUCLEUS PULPOSUS) WITH MYELOPATHY, CERVICAL: ICD-10-CM

## 2025-01-29 NOTE — TELEPHONE ENCOUNTER
Received C9 authorization for consult with Dr Ellison. Referral faxed with approved C9. Patient notified via [employer]

## 2025-02-11 PROBLEM — S16.1XXA CERVICAL STRAIN: Status: ACTIVE | Noted: 2025-02-11

## 2025-02-11 PROBLEM — M50.00 HERNIATED NUCLEUS PULPOSUS WITH MYELOPATHY, CERVICAL: Status: ACTIVE | Noted: 2025-02-11

## 2025-02-11 PROBLEM — M54.12 CERVICAL RADICULOPATHY: Status: ACTIVE | Noted: 2025-02-11

## 2025-03-27 DIAGNOSIS — F41.9 ANXIETY: ICD-10-CM

## 2025-03-27 RX ORDER — CLONAZEPAM 0.5 MG/1
0.5 TABLET ORAL 2 TIMES DAILY
Qty: 60 TABLET | Refills: 0 | Status: SHIPPED | OUTPATIENT
Start: 2025-03-27 | End: 2025-04-26

## 2025-03-27 NOTE — TELEPHONE ENCOUNTER
.Refill Request - Controlled Substance    CONFIRM preferred pharmacy with the patient.    If Mail Order Rx - Pend for 90 day refill.        Last Seen Department: 9/19/2024  Last Seen by PCP: 9/19/2024    Last Written: 12/29/24 180 with 0     Last UDS: 3/19/24    Med Agreement Signed On: 2/16/22    If no future appointment scheduled:  Review the last OV with PCP and review information for follow-up visit,  Route STAFF MESSAGE with patient name to the  Pool for scheduling with the following information:            -  Timing of next visit           -  Visit type ie Physical, OV, etc           -  Diagnoses/Reason ie. COPD, HTN - Do not use MEDICATION, Follow-up or CHECK UP - Give reason for visit        Next Appointment:   No future appointments.    Message sent to  to schedule appt with patient?  NO      Requested Prescriptions     Pending Prescriptions Disp Refills    clonazePAM (KLONOPIN) 0.5 MG tablet [Pharmacy Med Name: clonazePAM Oral Tablet 0.5 MG] 180 tablet 0     Sig: TAKE 1 TABLET BY MOUTH IN THE MORNING AND 1 TABLET BY MOUTH AT BEDTIME. MAX DAILY AMOUNT 2 TABLETS FOR 90 DAY SUPPLY

## 2025-03-28 ENCOUNTER — TELEPHONE (OUTPATIENT)
Dept: ADMINISTRATIVE | Age: 55
End: 2025-03-28

## 2025-03-28 NOTE — TELEPHONE ENCOUNTER
Submitted PA for clonazePAM 0.5MG tablets   Via CM (Key: [id number]) STATUS: PENDING.    Received a PA request thru [employer]. Your PA request cannot be processed for the member plan submitted. Please verify patients primary coverage to complete PA and submit back to PA pool    Thank you.

## 2025-04-01 NOTE — TELEPHONE ENCOUNTER
Left message for the patient to call the office back   Left message at [facility] to call the office the office if the patient has not picked up his script.   [employer] message sent.

## 2025-04-28 ENCOUNTER — OFFICE VISIT (OUTPATIENT)
Dept: FAMILY MEDICINE CLINIC | Age: 55
End: 2025-04-28
Payer: COMMERCIAL

## 2025-04-28 VITALS
DIASTOLIC BLOOD PRESSURE: 80 MMHG | WEIGHT: 221 LBS | BODY MASS INDEX: 32.73 KG/M2 | HEART RATE: 69 BPM | OXYGEN SATURATION: 98 % | HEIGHT: 69 IN | SYSTOLIC BLOOD PRESSURE: 134 MMHG

## 2025-04-28 DIAGNOSIS — F41.9 ANXIETY: ICD-10-CM

## 2025-04-28 DIAGNOSIS — E78.2 MIXED HYPERLIPIDEMIA: ICD-10-CM

## 2025-04-28 DIAGNOSIS — M54.12 CERVICAL RADICULOPATHY: ICD-10-CM

## 2025-04-28 DIAGNOSIS — Z51.81 MEDICATION MONITORING ENCOUNTER: Primary | ICD-10-CM

## 2025-04-28 PROCEDURE — 99214 OFFICE O/P EST MOD 30 MIN: CPT | Performed by: STUDENT IN AN ORGANIZED HEALTH CARE EDUCATION/TRAINING PROGRAM

## 2025-04-28 RX ORDER — ATORVASTATIN CALCIUM 80 MG/1
80 TABLET, FILM COATED ORAL DAILY
Qty: 90 TABLET | Refills: 1 | Status: SHIPPED | OUTPATIENT
Start: 2025-04-28

## 2025-04-28 RX ORDER — CLONAZEPAM 0.5 MG/1
0.5 TABLET ORAL 2 TIMES DAILY
Qty: 60 TABLET | Refills: 0 | Status: SHIPPED | OUTPATIENT
Start: 2025-04-28 | End: 2025-05-28

## 2025-04-28 RX ORDER — GABAPENTIN 300 MG/1
300 CAPSULE ORAL 2 TIMES DAILY
Qty: 60 CAPSULE | Refills: 1 | Status: CANCELLED | OUTPATIENT
Start: 2025-04-28 | End: 2025-06-27

## 2025-04-28 SDOH — ECONOMIC STABILITY: INCOME INSECURITY: IN THE LAST 12 MONTHS, WAS THERE A TIME WHEN YOU WERE NOT ABLE TO PAY THE MORTGAGE OR RENT ON TIME?: NO

## 2025-04-28 SDOH — ECONOMIC STABILITY: FOOD INSECURITY: WITHIN THE PAST 12 MONTHS, YOU WORRIED THAT YOUR FOOD WOULD RUN OUT BEFORE YOU GOT MONEY TO BUY MORE.: NEVER TRUE

## 2025-04-28 SDOH — ECONOMIC STABILITY: FOOD INSECURITY: WITHIN THE PAST 12 MONTHS, THE FOOD YOU BOUGHT JUST DIDN'T LAST AND YOU DIDN'T HAVE MONEY TO GET MORE.: NEVER TRUE

## 2025-04-28 SDOH — ECONOMIC STABILITY: TRANSPORTATION INSECURITY
IN THE PAST 12 MONTHS, HAS THE LACK OF TRANSPORTATION KEPT YOU FROM MEDICAL APPOINTMENTS OR FROM GETTING MEDICATIONS?: NO

## 2025-04-28 SDOH — ECONOMIC STABILITY: TRANSPORTATION INSECURITY
IN THE PAST 12 MONTHS, HAS LACK OF TRANSPORTATION KEPT YOU FROM MEETINGS, WORK, OR FROM GETTING THINGS NEEDED FOR DAILY LIVING?: NO

## 2025-04-28 ASSESSMENT — PATIENT HEALTH QUESTIONNAIRE - PHQ9
6. FEELING BAD ABOUT YOURSELF - OR THAT YOU ARE A FAILURE OR HAVE LET YOURSELF OR YOUR FAMILY DOWN: NOT AT ALL
1. LITTLE INTEREST OR PLEASURE IN DOING THINGS: SEVERAL DAYS
9. THOUGHTS THAT YOU WOULD BE BETTER OFF DEAD, OR OF HURTING YOURSELF: NOT AT ALL
8. MOVING OR SPEAKING SO SLOWLY THAT OTHER PEOPLE COULD HAVE NOTICED. OR THE OPPOSITE, BEING SO FIGETY OR RESTLESS THAT YOU HAVE BEEN MOVING AROUND A LOT MORE THAN USUAL: NOT AT ALL
4. FEELING TIRED OR HAVING LITTLE ENERGY: NEARLY EVERY DAY
SUM OF ALL RESPONSES TO PHQ QUESTIONS 1-9: 9
5. POOR APPETITE OR OVEREATING: SEVERAL DAYS
8. MOVING OR SPEAKING SO SLOWLY THAT OTHER PEOPLE COULD HAVE NOTICED. OR THE OPPOSITE - BEING SO FIDGETY OR RESTLESS THAT YOU HAVE BEEN MOVING AROUND A LOT MORE THAN USUAL: NOT AT ALL
SUM OF ALL RESPONSES TO PHQ QUESTIONS 1-9: 9
2. FEELING DOWN, DEPRESSED OR HOPELESS: SEVERAL DAYS
6. FEELING BAD ABOUT YOURSELF - OR THAT YOU ARE A FAILURE OR HAVE LET YOURSELF OR YOUR FAMILY DOWN: NOT AT ALL
7. TROUBLE CONCENTRATING ON THINGS, SUCH AS READING THE NEWSPAPER OR WATCHING TELEVISION: NOT AT ALL
10. IF YOU CHECKED OFF ANY PROBLEMS, HOW DIFFICULT HAVE THESE PROBLEMS MADE IT FOR YOU TO DO YOUR WORK, TAKE CARE OF THINGS AT HOME, OR GET ALONG WITH OTHER PEOPLE: SOMEWHAT DIFFICULT
5. POOR APPETITE OR OVEREATING: SEVERAL DAYS
SUM OF ALL RESPONSES TO PHQ QUESTIONS 1-9: 9
9. THOUGHTS THAT YOU WOULD BE BETTER OFF DEAD, OR OF HURTING YOURSELF: NOT AT ALL
3. TROUBLE FALLING OR STAYING ASLEEP: NEARLY EVERY DAY
7. TROUBLE CONCENTRATING ON THINGS, SUCH AS READING THE NEWSPAPER OR WATCHING TELEVISION: NOT AT ALL
1. LITTLE INTEREST OR PLEASURE IN DOING THINGS: SEVERAL DAYS
2. FEELING DOWN, DEPRESSED OR HOPELESS: SEVERAL DAYS
4. FEELING TIRED OR HAVING LITTLE ENERGY: NEARLY EVERY DAY
SUM OF ALL RESPONSES TO PHQ QUESTIONS 1-9: 9
10. IF YOU CHECKED OFF ANY PROBLEMS, HOW DIFFICULT HAVE THESE PROBLEMS MADE IT FOR YOU TO DO YOUR WORK, TAKE CARE OF THINGS AT HOME, OR GET ALONG WITH OTHER PEOPLE: SOMEWHAT DIFFICULT
3. TROUBLE FALLING OR STAYING ASLEEP: NEARLY EVERY DAY
SUM OF ALL RESPONSES TO PHQ QUESTIONS 1-9: 9

## 2025-04-28 NOTE — PROGRESS NOTES
Assessment:  Encounter Diagnoses   Name Primary?    Mixed hyperlipidemia     Anxiety     Cervical radiculopathy [M54.12]     Medication monitoring encounter Yes       Assessment & Plan      Plan:  1. Medication monitoring encounter  -     Drug Panel-PM-HI Res-UR Interp-A  2. Mixed hyperlipidemia  -     atorvastatin (LIPITOR) 80 MG tablet; Take 1 tablet by mouth daily, Disp-90 tablet, R-1Normal  -     Lipid, Fasting; Future  3. Anxiety  -     clonazePAM (KLONOPIN) 0.5 MG tablet; Take 1 tablet by mouth in the morning and at bedtime for 30 days. Max Daily Amount: 1 mg, Disp-60 tablet, R-0Normal  -     Drug Panel-PM-HI Res-UR Interp-A  4. Cervical radiculopathy [M54.12]  Doing well on current medications without reported adverse effects.  No signs of abuse or diversion of Klonopin.    Results      Return in about 6 months (around 10/28/2025).      Patient: Trent Barrera is a 54 y.o. male who presents today with the following Chief Complaint(s):  Chief Complaint   Patient presents with    Discuss Medications         HPI  History of Present Illness      Cervical radiculopathy  Planning for second epidural injection through workman's comp  Gabapentin 300mg BID    Chronic insomnia  Reports that he typically stays up at night and sleeps during the day    Anxiety  Klonopin   Zoloft 100mg daily  Doing well, intermittent panic attacks but rare and well controlled     HLD  Lipitor 80mg    Retired 4 months ago    Starting to increase exercise:   Current Outpatient Medications   Medication Sig Dispense Refill    atorvastatin (LIPITOR) 80 MG tablet Take 1 tablet by mouth daily 90 tablet 1    clonazePAM (KLONOPIN) 0.5 MG tablet Take 1 tablet by mouth in the morning and at bedtime for 30 days. Max Daily Amount: 1 mg 60 tablet 0    sertraline (ZOLOFT) 100 MG tablet Take 1 tablet by mouth every morning 90 tablet 3    therapeutic multivitamin-minerals (THERAGRAN-M) tablet Take 1 tablet by mouth daily Indications: OTC      gabapentin

## 2025-04-30 DIAGNOSIS — E78.2 MIXED HYPERLIPIDEMIA: ICD-10-CM

## 2025-04-30 LAB
CHOLEST SERPL-MCNC: 204 MG/DL (ref 0–199)
HDLC SERPL-MCNC: 55 MG/DL (ref 40–60)
LDL CHOLESTEROL: 122 MG/DL
TRIGL SERPL-MCNC: 134 MG/DL (ref 0–150)
VLDLC SERPL CALC-MCNC: 27 MG/DL

## 2025-05-02 ENCOUNTER — RESULTS FOLLOW-UP (OUTPATIENT)
Dept: FAMILY MEDICINE CLINIC | Age: 55
End: 2025-05-02

## 2025-05-02 LAB
6MAM UR QL: NOT DETECTED
7AMINOCLONAZEPAM UR QL: PRESENT
A-OH ALPRAZ UR QL: NOT DETECTED
ALPHA-OH-MIDAZOLAM, URINE: NOT DETECTED
ALPRAZ UR QL: NOT DETECTED
AMPHET UR QL SCN: NOT DETECTED
ANNOTATION COMMENT IMP: NORMAL
ANNOTATION COMMENT IMP: NORMAL
BARBITURATES UR QL: NEGATIVE
BUPRENORPHINE UR QL: NOT DETECTED
BZE UR QL: NEGATIVE
CARBOXYTHC UR QL: NEGATIVE
CARISOPRODOL UR QL: NEGATIVE
CLONAZEPAM UR QL: NOT DETECTED
CODEINE UR QL: NOT DETECTED
CREAT UR-MCNC: 47.3 MG/DL (ref 20–400)
DIAZEPAM UR QL: NOT DETECTED
ETHYL GLUCURONIDE UR QL: NORMAL
FENTANYL UR QL: NOT DETECTED
GABAPENTIN: PRESENT
HYDROCODONE UR QL: NOT DETECTED
HYDROMORPHONE UR QL: NOT DETECTED
LORAZEPAM UR QL: NOT DETECTED
MDA UR QL: NOT DETECTED
MDEA UR QL: NOT DETECTED
MDMA UR QL: NOT DETECTED
MEPERIDINE UR QL: NOT DETECTED
METHADONE UR QL: NEGATIVE
METHAMPHET UR QL: NOT DETECTED
MIDAZOLAM UR QL SCN: NOT DETECTED
MORPHINE UR QL: NOT DETECTED
NALOXONE: NOT DETECTED
NORBUPRENORPHINE UR QL CFM: NOT DETECTED
NORDIAZEPAM UR QL: NOT DETECTED
NORFENTANYL UR QL: NOT DETECTED
NORHYDROCODONE UR QL CFM: NOT DETECTED
NOROXYCODONE UR QL CFM: NOT DETECTED
NOROXYMORPHONE, URINE: NOT DETECTED
OXAZEPAM UR QL: NOT DETECTED
OXYCODONE UR QL: NOT DETECTED
OXYMORPHONE UR QL: NOT DETECTED
PATHOLOGY STUDY: NORMAL
PCP UR QL: NEGATIVE
PHENTERMINE UR QL: NOT DETECTED
PPAA UR QL: NOT DETECTED
PREGABALIN: NOT DETECTED
SERVICE CMNT-IMP: NORMAL
TAPENTADOL UR QL SCN: NOT DETECTED
TAPENTADOL-O-SULFATE, URINE: NOT DETECTED
TEMAZEPAM UR QL: NOT DETECTED
TRAMADOL UR QL: NEGATIVE
ZOLPIDEM UR QL: NOT DETECTED

## 2025-06-09 DIAGNOSIS — F41.9 ANXIETY: ICD-10-CM

## 2025-06-09 NOTE — TELEPHONE ENCOUNTER
.Refill Request - Controlled Substance    CONFIRM preferred pharmacy with the patient.    If Mail Order Rx - Pend for 90 day refill.        Last Seen Department: 4/28/2025  Last Seen by PCP: 4/28/2025    Last Written: 4-28-25 60 with 0     Last UDS: 4-28-25     Med Agreement Signed On: 2-16-22    If no future appointment scheduled:  Review the last OV with PCP and review information for follow-up visit,  Route STAFF MESSAGE with patient name to the  Pool for scheduling with the following information:            -  Timing of next visit           -  Visit type ie Physical, OV, etc           -  Diagnoses/Reason ie. COPD, HTN - Do not use MEDICATION, Follow-up or CHECK UP - Give reason for visit        Next Appointment:   Future Appointments   Date Time Provider Department Center   8/26/2025 10:20 AM Jared Ellison MD [facility]   10/28/2025  1:00 PM Onur Kong, DO [facility]       Message sent to  to schedule appt with patient?  NO      Requested Prescriptions     Pending Prescriptions Disp Refills    clonazePAM (KLONOPIN) 0.5 MG tablet 60 tablet 0     Sig: Take 1 tablet by mouth in the morning and at bedtime for 30 days. Max Daily Amount: 1 mg

## 2025-06-10 RX ORDER — CLONAZEPAM 0.5 MG/1
0.5 TABLET ORAL 2 TIMES DAILY
Qty: 60 TABLET | Refills: 2 | Status: SHIPPED | OUTPATIENT
Start: 2025-06-10 | End: 2025-07-10

## 2025-07-02 DIAGNOSIS — F41.9 ANXIETY: ICD-10-CM

## 2025-07-03 RX ORDER — CLONAZEPAM 0.5 MG/1
0.5 TABLET ORAL 2 TIMES DAILY
Qty: 60 TABLET | Refills: 0 | Status: SHIPPED | OUTPATIENT
Start: 2025-07-10 | End: 2025-08-09

## 2025-07-03 NOTE — TELEPHONE ENCOUNTER
Refill Request - Controlled Substance    CONFIRM preferred pharmacy with the patient.    If Mail Order Rx - Pend for 90 day refill.        Last Seen Department: 4/28/2025  Last Seen by PCP: 4/28/2025    Last Written: 6/10/25 60 with 2 refills     Last UDS: 4/28/25     Med Agreement Signed On: 2/16/22    If no future appointment scheduled:  Review the last OV with PCP and review information for follow-up visit,  Route STAFF MESSAGE with patient name to the  Pool for scheduling with the following information:            -  Timing of next visit           -  Visit type ie Physical, OV, etc           -  Diagnoses/Reason ie. COPD, HTN - Do not use MEDICATION, Follow-up or CHECK UP - Give reason for visit        Next Appointment:   Future Appointments   Date Time Provider Department Center   8/26/2025 10:20 AM Jared Ellison MD [facility]   10/28/2025  1:00 PM Onur Kong, DO [facility]       Message sent to  to schedule appt with patient?  NO      Requested Prescriptions     Pending Prescriptions Disp Refills    clonazePAM (KLONOPIN) 0.5 MG tablet 60 tablet 2     Sig: Take 1 tablet by mouth in the morning and at bedtime for 30 days. Max Daily Amount: 1 mg

## 2025-08-13 DIAGNOSIS — F41.9 ANXIETY: ICD-10-CM

## 2025-08-14 RX ORDER — CLONAZEPAM 0.5 MG/1
0.5 TABLET ORAL 2 TIMES DAILY
Qty: 60 TABLET | Refills: 0 | Status: SHIPPED | OUTPATIENT
Start: 2025-08-14 | End: 2025-09-13

## (undated) DEVICE — NEEDLE SCLERO 23GA L240CM OD064MM ID032MM CLR INTERJECT

## (undated) DEVICE — SNARE VASC L240CM LOOP W10MM SHTH DIA2.4MM RND STIFF CLD

## (undated) DEVICE — CLIP LIG L235CM RESOL 360 BX/20

## (undated) DEVICE — ENDO CARRY-ON PROCEDURE KIT INCLUDES SUCTION TUBING, LUBRICANT, GAUZE, BIOHAZARD STICKER, TRANSPORT PAD AND INTERCEPT BEDSIDE KIT.: Brand: ENDO CARRY-ON PROCEDURE KIT

## (undated) DEVICE — TRAP POLYP ETRAP

## (undated) DEVICE — Device: Brand: DISPOSABLE ELECTROSURGICAL SNARE

## (undated) DEVICE — ELECTRODE PT RET AD L9FT HI MOIST COND ADH HYDRGEL CORDED